# Patient Record
Sex: FEMALE | Race: WHITE | Employment: OTHER | URBAN - METROPOLITAN AREA
[De-identification: names, ages, dates, MRNs, and addresses within clinical notes are randomized per-mention and may not be internally consistent; named-entity substitution may affect disease eponyms.]

---

## 2017-10-03 ENCOUNTER — OFFICE VISIT (OUTPATIENT)
Dept: INTERNAL MEDICINE CLINIC | Age: 72
End: 2017-10-03

## 2017-10-03 ENCOUNTER — HOSPITAL ENCOUNTER (OUTPATIENT)
Dept: LAB | Age: 72
Discharge: HOME | End: 2017-10-03
Payer: MEDICARE

## 2017-10-03 VITALS
SYSTOLIC BLOOD PRESSURE: 130 MMHG | BODY MASS INDEX: 28.32 KG/M2 | OXYGEN SATURATION: 97 % | RESPIRATION RATE: 12 BRPM | TEMPERATURE: 97.7 F | HEIGHT: 65 IN | DIASTOLIC BLOOD PRESSURE: 82 MMHG | HEART RATE: 85 BPM | WEIGHT: 170 LBS

## 2017-10-03 DIAGNOSIS — Z00.00 MEDICARE ANNUAL WELLNESS VISIT, SUBSEQUENT: Primary | ICD-10-CM

## 2017-10-03 DIAGNOSIS — F51.02 ADJUSTMENT INSOMNIA: ICD-10-CM

## 2017-10-03 DIAGNOSIS — M85.88 OSTEOPENIA OF SPINE: ICD-10-CM

## 2017-10-03 DIAGNOSIS — M17.0 PRIMARY OSTEOARTHRITIS OF BOTH KNEES: ICD-10-CM

## 2017-10-03 DIAGNOSIS — Z23 ENCOUNTER FOR IMMUNIZATION: ICD-10-CM

## 2017-10-03 PROCEDURE — 82306 VITAMIN D 25 HYDROXY: CPT

## 2017-10-03 PROCEDURE — 36415 COLL VENOUS BLD VENIPUNCTURE: CPT

## 2017-10-03 RX ORDER — LANOLIN ALCOHOL/MO/W.PET/CERES
3 CREAM (GRAM) TOPICAL
COMMUNITY
Start: 2017-10-03

## 2017-10-03 NOTE — PROGRESS NOTES
HISTORY OF PRESENT ILLNESS  Jose Rowan is a 74 y.o. female. HPI  Here for medicare wellness. Osteopenia on dexa 2015. On calcium and vit D. Hx of osteoarthritis involving knees. Uses naproxen prn with some relief but limits exercise. Patient Active Problem List   Diagnosis Code    Basal cell cancer C44.91    RBBB I45.10    Advanced care planning/counseling discussion Z71.89     Past Surgical History:   Procedure Laterality Date    HX CATARACT REMOVAL      bilateral    HX HYSTERECTOMY      1977 - bleeding    HX ORTHOPAEDIC      Left Knee meniscus tear     Social History     Social History    Marital status:      Spouse name: N/A    Number of children: N/A    Years of education: N/A     Occupational History    Not on file. Social History Main Topics    Smoking status: Never Smoker    Smokeless tobacco: Never Used    Alcohol use 1.2 - 1.8 oz/week     2 - 3 Glasses of wine per week      Comment: social    Drug use: No    Sexual activity: Not Currently     Other Topics Concern    Not on file     Social History Narrative     Family History   Problem Relation Age of Onset    Cancer Mother      colon    Heart Disease Father     Heart Disease Paternal Uncle     Heart Disease Paternal Grandfather     Diabetes Neg Hx      Current Outpatient Prescriptions   Medication Sig    melatonin 3 mg tablet Take 1 Tab by mouth nightly.  estradiol (ESTRACE) 0.5 mg tablet TAKE 1 TABLET BY MOUTH DAILY    naproxen (NAPROSYN) 375 mg tablet Take 1 Tab by mouth two (2) times daily (with meals). (Patient taking differently: Take 375 mg by mouth daily as needed.)    calcium citrate-vitamin d3 (CITRACAL + D) 315-200 mg-unit tab Take 1 Tab by mouth daily (with breakfast).  multivitamin (ONE A DAY) tablet Take 1 Tab by mouth BID Mon Wed & Fri.  Cholecalciferol, Vitamin D3, (VITAMIN D3) 1,000 unit cap Take 1,000 Units by mouth daily.     GLUCOSAMINE/CHONDRO BARROSO A (COSAMIN DS PO) Take 1 Tab by mouth daily. No current facility-administered medications for this visit. No Known Allergies  Immunization History   Administered Date(s) Administered    Influenza High Dose Vaccine PF 09/30/2015, 09/18/2016, 10/03/2017    Influenza Vaccine 11/01/2014    Pneumococcal Conjugate (PCV-13) 08/07/2015    Pneumococcal Vaccine (Unspecified Type) 12/10/2012         Review of Systems   Psychiatric/Behavioral: The patient has insomnia (trouble maintiaining sleep for months or longer. fatigue, drrowsiness during day). Physical Exam   Constitutional: She appears well-developed and well-nourished. No distress. /82 (BP 1 Location: Left arm, BP Patient Position: Sitting)  Pulse 85  Temp 97.7 °F (36.5 °C) (Oral)   Resp 12  Ht 5' 5\" (1.651 m)  Wt 170 lb (77.1 kg)  SpO2 97%  BMI 28.29 kg/m2Body mass index is 28.29 kg/(m^2). HENT:   Mouth/Throat: Oropharynx is clear and moist.   Neck: No JVD present. Carotid bruit is not present. Cardiovascular: Normal rate, regular rhythm, normal heart sounds and intact distal pulses. Pulmonary/Chest: Effort normal and breath sounds normal.   Musculoskeletal: She exhibits no edema. Neurological: She is alert. Skin: Skin is warm and dry. She is not diaphoretic. Nursing note and vitals reviewed. ASSESSMENT and PLAN  Diagnoses and all orders for this visit:    1. Medicare annual wellness visit, subsequent  Delvin Velázquez received a complete medicare wellness assessment today by Lia Devi, PharmD. I've reviewed her assessment note and all screening/preventative plans addressed. I also addressed all questions and concerns surrounding the assessment and plans with Delvin Velázquez. 2. Osteopenia of spine - check vit D. Recheck BMD in 1 year  -     VITAMIN D, 25 HYDROXY    3. Adjustment insomnia  Try melatonin. 4. Encounter for immunization  -     Influenza virus vaccine ([address]) 72 years and older ([address]759)    5.  Primary osteoarthritis of both knees - nonwt bearing exercises discussed. Tylenol up to 1g tid      Follow-up Disposition:  Return in about 1 year (around 10/3/2018).

## 2017-10-03 NOTE — PROGRESS NOTES
Dr. Matt Vargas referred Heather Kumar, 1945, a 74 y.o. female for a Medicare Annual Wellness Visit (AWV)    This is a Subsequent Medicare Annual Wellness Exam (AWV) (Performed 12 months after IPPE or effective date of Medicare Part B enrollment, Once in a lifetime)    I have reviewed the patient's medical history in detail and updated the computerized patient record. History     Past Medical History:   Diagnosis Date    Achilles tendinitis 2015    Arthritis     knees    Falling hair     Fatigue     Insomnia     Joint pain     Plantar fasciitis       Past Surgical History:   Procedure Laterality Date    HX CATARACT REMOVAL      bilateral    HX HYSTERECTOMY      1977 - bleeding    HX ORTHOPAEDIC      Left Knee meniscus tear     Current Outpatient Prescriptions   Medication Sig Dispense Refill    melatonin 3 mg tablet Take 1 Tab by mouth nightly.  estradiol (ESTRACE) 0.5 mg tablet TAKE 1 TABLET BY MOUTH DAILY 90 Tab 1    naproxen (NAPROSYN) 375 mg tablet Take 1 Tab by mouth two (2) times daily (with meals). (Patient taking differently: Take 375 mg by mouth daily as needed.) 20 Tab 0    calcium citrate-vitamin d3 (CITRACAL + D) 315-200 mg-unit tab Take 1 Tab by mouth daily (with breakfast).  multivitamin (ONE A DAY) tablet Take 1 Tab by mouth BID Mon Wed & Fri.  Cholecalciferol, Vitamin D3, (VITAMIN D3) 1,000 unit cap Take 1,000 Units by mouth daily.  GLUCOSAMINE/CHONDRO BARROSO A (COSAMIN DS PO) Take 1 Tab by mouth daily.        No Known Allergies  Family History   Problem Relation Age of Onset    Cancer Mother      colon    Heart Disease Father     Heart Disease Paternal Uncle     Heart Disease Paternal Grandfather     Diabetes Neg Hx      Social History   Substance Use Topics    Smoking status: Never Smoker    Smokeless tobacco: Never Used    Alcohol use 1.2 - 1.8 oz/week     2 - 3 Glasses of wine per week      Comment: social     Patient Active Problem List Diagnosis Code    Basal cell cancer C44.91    RBBB I45.10    Advanced care planning/counseling discussion Z71.89       Depression Risk Factor Screening:     PHQ over the last two weeks 10/3/2017   PHQ Not Done Patient Decline   Little interest or pleasure in doing things -   Feeling down, depressed or hopeless -   Total Score PHQ 2 -     Alcohol Risk Factor Screening: You do not drink alcohol or very rarely. Functional Ability and Level of Safety:   Hearing Loss  Hearing is good. Activities of Daily Living  The home contains: no safety equipment. Patient does total self care    ADL Assessment 10/3/2017   Feeding yourself No Help Needed   Getting from bed to chair No Help Needed   Getting dressed No Help Needed   Bathing or showering No Help Needed   Walk across the room (includes cane/walker) No Help Needed   Using the telphone No Help Needed   Taking your medications No Help Needed   Preparing meals No Help Needed   Managing money (expenses/bills) No Help Needed   Moderately strenuous housework (laundry) No Help Needed   Shopping for personal items (toiletries/medicines) No Help Needed   Shopping for groceries No Help Needed   Driving No Help Needed   Climbing a flight of stairs No Help Needed   Getting to places beyond walking distances No Help Needed       Fall RiskFall Risk Assessment, last 12 mths 10/3/2017   Able to walk? Yes   Fall in past 12 months? No   Fall with injury? -   Number of falls in past 12 months -   Fall Risk Score -       Abuse Screen  Patient is not abused    Abuse Screening Questionnaire 10/3/2017   Do you ever feel afraid of your partner? N   Are you in a relationship with someone who physically or mentally threatens you? N   Is it safe for you to go home?  Y       Cognitive Screening   Evaluation of Cognitive Function:  Has your family/caregiver stated any concerns about your memory: no  Normal    Patient Care Team   Patient Care Team:  Burton Grey MD as PCP - General (Internal Medicine)    Assessment/Plan   Education and counseling provided:  Are appropriate based on today's review and evaluation  End-of-Life planning (with patient's consent)  Pneumococcal Vaccine  Influenza Vaccine  Screening Mammography  Colorectal cancer screening tests  Cardiovascular screening blood test  Bone mass measurement (DEXA)  Screening for glaucoma  Diabetes screening test  Tdap and Zostavax    Diagnoses and all orders for this visit:    1. Medicare annual wellness visit, subsequent    2. Osteopenia of spine  -     VITAMIN D, 25 HYDROXY    3. Adjustment insomnia    4. Encounter for immunization  -     Influenza virus vaccine ([address]) 72 years and older (85459)    5. Primary osteoarthritis of both knees        Health Maintenance Due   Topic Date Due    DTaP/Tdap/Td series (1 - Tdap) 01/12/1964    FOBT Q 1 YEAR AGE 50-75  01/12/1993    ZOSTER VACCINE AGE 60>  11/12/2002    INFLUENZA AGE 9 TO ADULT  08/01/2017    MEDICARE YEARLY EXAM  09/28/2017     . Medicare Annual Wellness Visit:  ----Immunizations: Patient confirmed the following records of vaccinations are correct and current. Immunization History   Administered Date(s) Administered    Influenza High Dose Vaccine PF 09/30/2015, 09/18/2016, 10/03/2017    Influenza Vaccine 11/01/2014    Pneumococcal Conjugate (PCV-13) 08/07/2015    Pneumococcal Vaccine (Unspecified Type) 12/10/2012   Encouraged patient to get a Tdap vaccine since she is around her young grandchildren. She received a high dose flu shot today. Patient is not interested in the shingles vaccine at this time as her  is immunocompromised. ----Screenings: See chart in patient instructions for specific information. ADL's, Fall Risk, Depression Screening and Abuse screening information is reported above. Patient is eligible for a repeat dexa any time now- last was done 9/2015 and showed osteopenia.  Patient is due for repeat colonoscopy in 2022 and sees  Ana Coffman for her eye exams twice a year. Patient is interested in spreading mammograms out to every 2 years. Reviewed labwork with patient from 9/2016.     ----Medication Reconciliation was performed today and the following 4 changes were made: directions added to vitamin D, MVI and glucosamine. Medications Discontinued During This Encounter   Medication Reason    ranitidine (ZANTAC) 150 mg tablet Not A Current Medication     ----Advanced Care Plan: Patient educated on the importance of an advanced care plan and paperwork was given to the patient today. Asked patient to read over the information and bring it back to her next appointment with her physician. Patient verbalized understanding of information presented. Answered all of the patient's questions. AVS handed and reviewed with patient which includes a Medicare Wellness Preventative Screening Table and patient specific information. Notification of recommendations will be sent to Dr. Jona Libman for review.     Brigitte Steven, PharmD, BCPS, CDE

## 2017-10-03 NOTE — PATIENT INSTRUCTIONS
Medicare Part B Preventive Services Limitations Recommendation Scheduled   Bone Mass Measurement  (age 72 & older, biennial) Requires diagnosis related to osteoporosis or estrogen deficiency. Biennial benefit unless patient has history of long-term glucocorticoid tx or baseline is needed because initial test was by other method Last: 9/1/2015    A DEXA scan is recommended after you turn 72years of age to determine your risk for osteoporosis Next: 9/2017 or as recommended by your physician   Colorectal Cancer Screening  -Fecal occult blood test (annual)  -Flexible sigmoidoscopy (5y)  -Screening colonoscopy (10y)  -Barium Enema  Last: 7/2017    Every 5-10 years depending on your colonoscopy result starting at age 48 years Next: 2022 or as recommended by your physician   Glaucoma Screening (no USPSTF recommendation) Diabetes mellitus, family history, , age 48 or over,  American, age 72 or over Last: 8/2017    Karine Tom    Every year Next: goes twice a year   Screening Mammography (biennial age 54-69) Annually (age 36 or over) Last: 9/2016 Next: 9/2017   Screening Pap Tests and Pelvic Examination (up to age 72 and after 72 if unknown history or abnormal study last 10 years) Every 24 months except high risk Last: NA Next:    Discuss with your doctor if this screening is appropriate for you. Cardiovascular Screening Blood Tests (every 5 years)  Total cholesterol, HDL, Triglycerides Order as a panel if possible Last: 2015    Every Year Next: As recommended by your physician   Diabetes Screening Tests (at least every 3 years, Medicare covers annually or at 6-month intervals for prediabetic patients)    Fasting blood sugar (FBS) or glucose tolerance test (GTT) Patient must be diagnosed with one of the following:  -Hypertension, Dyslipidemia, obesity, previous impaired FBS or GTT  Or any two of the following: overweight, FH of diabetes, age ? 72, history of gestational diabetes, birth of baby weighing more than 9 pounds Last: 9/2016    Every 3-6 months depending on your result    Every 3 years Next: Check with yearly labs   Diabetes Self-Management Training (DSMT) (no USPSTF recommendation) Requires referral by treating physician for patient with diabetes or renal disease. 10 hours of initial DSMT session of no less than 30 minutes each in a continuous 12-month period. 2 hours of follow-up DSMT in subsequent years. Last: NA Talk to your doctor if you are interested in a refresher course. Medical Nutrition Therapy (MNT) (for diabetes or renal disease not recommended schedule) Requires referral by treating physician for patient with diabetes or renal disease. Can be provided in same year as diabetes self-management training (DSMT), and CMS recommends medical nutrition therapy take place after DSMT. Up to 3 hours for initial year and 2 hours in subsequent years. Last: NA Talk to your doctor if you are interested in a refresher course. Seasonal Influenza Vaccination (annually)  Last: 2016    Every Fall Please get one this Fall. Pneumococcal Vaccination (once after 65)  Last:  Pneumovax:  12/2012  Prevnar-13:  8/2015 Complete   Shingles Vaccination  Last:    A shingles vaccine is recommended once in a lifetime after age 61  is on chemo   Tetanus, Diphtheria and Pertussis (Tdap) Vaccination Booster One Booster as an adult and then tetanus every 10 years or as indicated Last: Please get one at your pharmacy. Hepatitis B Vaccinations (if medium/high risk) Medium/high risk factors:  End-stage renal disease,  Hemophiliacs who received Factor VIII or IX concentrates, Clients of institutions for the mentally retarded, Persons who live in the same house as a HepB virus carrier, Homosexual men, Illicit injectable drug abusers.  Last: NA Next: NA   Counseling to Prevent Tobacco Use (up to 8 sessions per year)  - Counseling greater than 3 and up to 10 minutes  - Counseling greater than 10 minutes Patients must be asymptomatic of tobacco-related conditions to receive as preventive service Last: NA Next: NA   Human Immunodeficiency Virus (HIV) Screening (annually for increased risk patients)  HIV-1 and HIV-2 by EIA, VANNESA, rapid antibody test, or oral mucosa transudate Patient must be at increased risk for HIV infection per USPSTF guidelines or pregnant. Tests covered annually for patients at increased risk. Pregnant patients may receive up to 3 test during pregnancy. Last: NA Next: NA   Ultrasound Screening for Abdominal Aortic Aneurysm (AAA) once Patient must be referred through IPPE and not have had a screening for abdominal aortic aneurysm before under medicare. Limited to patients who meet onf of the following criteria:  -Men who are 73-68 years old and have smoked more than 100 cigarettes in their lifetime  -Anyone with a FH of AAA  -Anyone recommended for screening by the USPSFTF As recommended by your PCP or Specialist   As recommended by your PCP or Specialist     NA = Not Applicable; NI= Not Indicated     1) Consider getting the Tetanus/Pertussis (Tetanus with whooping cough) at your local pharmacy.

## 2017-10-03 NOTE — MR AVS SNAPSHOT
Visit Information Date & Time Provider Department Dept. Phone Encounter #  
 10/3/2017  2:30 PM Giovana Whitney MD [facility] [address] [id number] Follow-up Instructions Return in about 1 year (around 10/3/2018). Upcoming Health Maintenance Date Due DTaP/Tdap/Td series (1 - Tdap) 1/12/1964 FOBT Q 1 YEAR AGE 50-75 1/12/1993 ZOSTER VACCINE AGE 60> 11/12/2002 INFLUENZA AGE 9 TO ADULT 8/1/2017 MEDICARE YEARLY EXAM 9/28/2017 GLAUCOMA SCREENING Q2Y 8/19/2018 Allergies as of 10/3/2017  Review Complete On: 10/3/2017 By: Aliya Fraser No Known Allergies Current Immunizations  Reviewed on 12/10/2014 Name Date Influenza High Dose Vaccine PF  Incomplete, 9/18/2016, 9/30/2015 Influenza Vaccine 11/1/2014 Pneumococcal Conjugate (PCV-13) 8/7/2015 Pneumococcal Vaccine (Unspecified Type) 12/10/2012 Not reviewed this visit You Were Diagnosed With   
  
 Codes Comments Medicare annual wellness visit, subsequent    -  Primary ICD-10-CM: Z00.00 ICD-9-CM: V70.0 Osteopenia of spine     ICD-10-CM: M85.88 ICD-9-CM: 733.90 Adjustment insomnia     ICD-10-CM: F51.02 
ICD-9-CM: 307.41 Encounter for immunization     ICD-10-CM: B75 ICD-9-CM: V03.89 Vitals BP Pulse Temp Resp Height(growth percentile) Weight(growth percentile) 130/82 (BP 1 Location: Left arm, BP Patient Position: Sitting) 85 97.7 °F (36.5 °C) (Oral) 12 5' 5\" (1.651 m) 170 lb (77.1 kg) SpO2 BMI OB Status Smoking Status 97% 28.29 kg/m2 Hysterectomy Never Smoker Vitals History BMI and BSA Data Body Mass Index Body Surface Area  
 28.29 kg/m 2 1.88 m 2 Preferred Pharmacy Pharmacy Name Phone [facility] 1 [address]  [facility] [phone] Your Updated Medication List  
  
   
 This list is accurate as of: 10/3/17  3:20 PM.  Always use your most recent med list.  
  
  
  
  
 Willye New [place] D 315-200 mg-unit Tab Generic drug:  calcium citrate-vitamin d3 Take 1 Tab by mouth daily (with breakfast). COSAMIN DS PO Take 1 Tab by mouth daily. estradiol 0.5 mg tablet Commonly known as:  ESTRACE  
TAKE 1 TABLET BY MOUTH DAILY  
  
 melatonin 3 mg tablet Take 1 Tab by mouth nightly. multivitamin tablet Commonly known as:  ONE A DAY Take 1 Tab by mouth BID Mon Wed & Fri.  
  
 naproxen 375 mg tablet Commonly known as:  NAPROSYN Take 1 Tab by mouth two (2) times daily (with meals). VITAMIN D3 1,000 unit Cap Generic drug:  cholecalciferol Take 1,000 Units by mouth daily. We Performed the Following INFLUENZA VIRUS VACCINE, HIGH DOSE SEASONAL, PRESERVATIVE FREE [68280 CPT(R)] VITAMIN D, 25 HYDROXY [id number] CPT(R)] Follow-up Instructions Return in about 1 year (around 10/3/2018). Patient Instructions Medicare Part B Preventive Services Limitations Recommendation Scheduled Bone Mass Measurement 
(age 72 & older, biennial) Requires diagnosis related to osteoporosis or estrogen deficiency. Biennial benefit unless patient has history of long-term glucocorticoid tx or baseline is needed because initial test was by other method Last: 9/1/2015 A DEXA scan is recommended after you turn 72years of age to determine your risk for osteoporosis Next: 9/2017 or as recommended by your physician Colorectal Cancer Screening 
-Fecal occult blood test (annual) -Flexible sigmoidoscopy (5y) 
-Screening colonoscopy (10y) -Barium Enema  Last: 7/2017 Every 5-10 years depending on your colonoscopy result starting at age 48 years Next: 2022 or as recommended by your physician Glaucoma Screening (no USPSTF recommendation) Diabetes mellitus, family history, , age 48 or over,  American, age 72 or over Last: 8/2017 Jackelyn Hester Every year Next: goes twice a year Screening Mammography (biennial age 54-69) Annually (age 36 or over) Last: 9/2016 Next: 9/2017 Screening Pap Tests and Pelvic Examination (up to age 72 and after 72 if unknown history or abnormal study last 10 years) Every 24 months except high risk Last: NA Next: 
 
Discuss with your doctor if this screening is appropriate for you. Cardiovascular Screening Blood Tests (every 5 years) Total cholesterol, HDL, Triglycerides Order as a panel if possible Last: 2015 Every Year Next: As recommended by your physician Diabetes Screening Tests (at least every 3 years, Medicare covers annually or at 6-month intervals for prediabetic patients) Fasting blood sugar (FBS) or glucose tolerance test (GTT) Patient must be diagnosed with one of the following: 
-Hypertension, Dyslipidemia, obesity, previous impaired FBS or GTT 
Or any two of the following: overweight, FH of diabetes, age ? 72, history of gestational diabetes, birth of baby weighing more than 9 pounds Last: 9/2016 Every 3-6 months depending on your result Every 3 years Next: Check with yearly labs Diabetes Self-Management Training (DSMT) (no USPSTF recommendation) Requires referral by treating physician for patient with diabetes or renal disease. 10 hours of initial DSMT session of no less than 30 minutes each in a continuous 12-month period. 2 hours of follow-up DSMT in subsequent years. Last: NA Talk to your doctor if you are interested in a refresher course. Medical Nutrition Therapy (MNT) (for diabetes or renal disease not recommended schedule) Requires referral by treating physician for patient with diabetes or renal disease. Can be provided in same year as diabetes self-management training (DSMT), and CMS recommends medical nutrition therapy take place after DSMT.   Up to 3 hours for initial year and 2 hours in subsequent years. Last: NA Talk to your doctor if you are interested in a refresher course. Seasonal Influenza Vaccination (annually)  Last: 2016 Every Fall Please get one this Fall. Pneumococcal Vaccination (once after 65)  Last: 
Pneumovax: 
12/2012 Prevnar-13: 
8/2015 Complete Shingles Vaccination  Last: A shingles vaccine is recommended once in a lifetime after age 61  is on chemo Tetanus, Diphtheria and Pertussis (Tdap) Vaccination Booster One Booster as an adult and then tetanus every 10 years or as indicated Last: Please get one at your pharmacy. Hepatitis B Vaccinations (if medium/high risk) Medium/high risk factors:  End-stage renal disease, Hemophiliacs who received Factor VIII or IX concentrates, Clients of institutions for the mentally retarded, Persons who live in the same house as a HepB virus carrier, Homosexual men, Illicit injectable drug abusers. Last: NA Next: NA  
Counseling to Prevent Tobacco Use (up to 8 sessions per year) - Counseling greater than 3 and up to 10 minutes - Counseling greater than 10 minutes Patients must be asymptomatic of tobacco-related conditions to receive as preventive service Last: NA Next: NA Human Immunodeficiency Virus (HIV) Screening (annually for increased risk patients) HIV-1 and HIV-2 by EIA, VANNESA, rapid antibody test, or oral mucosa transudate Patient must be at increased risk for HIV infection per USPSTF guidelines or pregnant. Tests covered annually for patients at increased risk. Pregnant patients may receive up to 3 test during pregnancy. Last: NA Next: NA Ultrasound Screening for Abdominal Aortic Aneurysm (AAA) once Patient must be referred through IPPE and not have had a screening for abdominal aortic aneurysm before under medicare. Limited to patients who meet onf of the following criteria: 
-Men who are 73-68 years old and have smoked more than 100 cigarettes in their lifetime 
-Anyone with a FH of AAA -Anyone recommended for screening by the USPSFTF As recommended by your PCP or Specialist 
 As recommended by your PCP or Specialist 
  
NA = Not Applicable; NI= Not Indicated 1) Consider getting the Tetanus/Pertussis (Tetanus with whooping cough) at your local pharmacy. Introducing [facility] & [facility] SERVICES! Dear Bay Thomason: 
Thank you for requesting a [employer] account. Our records indicate that you already have an active [employer] account. You can access your account anytime at [email or web address] Did you know that you can access your hospital and ER discharge instructions at any time in Lot[employer]? You can also review all of your test results from your hospital stay or ER visit. Additional Information If you have questions, please visit the Frequently Asked Questions section of the [employer] website at [email or web address] Remember, [employer] is NOT to be used for urgent needs. For medical emergencies, dial 911. Now available from your iPhone and Android! Please provide this summary of care documentation to your next provider. Your primary care clinician is listed as Hank Arizmendi. If you have any questions after today's visit, please call [phone].

## 2017-10-04 LAB — 25(OH)D3+25(OH)D2 SERPL-MCNC: 42.9 NG/ML (ref 30–100)

## 2017-10-31 ENCOUNTER — OFFICE VISIT (OUTPATIENT)
Dept: INTERNAL MEDICINE CLINIC | Age: 72
End: 2017-10-31

## 2017-10-31 VITALS
SYSTOLIC BLOOD PRESSURE: 130 MMHG | OXYGEN SATURATION: 96 % | RESPIRATION RATE: 18 BRPM | TEMPERATURE: 97.8 F | BODY MASS INDEX: 28.22 KG/M2 | WEIGHT: 169.38 LBS | HEIGHT: 65 IN | DIASTOLIC BLOOD PRESSURE: 81 MMHG | HEART RATE: 81 BPM

## 2017-10-31 DIAGNOSIS — H61.21 EXCESSIVE CERUMEN IN EAR CANAL, RIGHT: Primary | ICD-10-CM

## 2017-10-31 NOTE — PROGRESS NOTES
HISTORY OF PRESENT ILLNESS  Kit Dee is a 74 y.o. female. HPI  Problem visit:  Kit Dee is here for complaint of R ear wax. Unknown duration  Severity is mild  Character of problem: hearing loss on r side  wax makes the problem worse. nothing makes the problem better. Associated symptoms include: no otalgia, drainage  Treatments tried include: medication not used      ROS    Physical Exam   HENT:   Right Ear: Ear canal normal.   Left Ear: Tympanic membrane and ear canal normal. Tympanic membrane is not injected. No decreased hearing is noted. 90 % cerumen impaction in R canal otherwis normal.   Lymphadenopathy:     She has no cervical adenopathy. Visit Vitals    /81 (BP 1 Location: Left arm, BP Patient Position: Sitting)    Pulse 81    Temp 97.8 °F (36.6 °C) (Oral)    Resp 18    Ht 5' 5\" (1.651 m)    Wt 169 lb 6 oz (76.8 kg)    SpO2 96%    BMI 28.19 kg/m2         ASSESSMENT and PLAN  Diagnoses and all orders for this visit:    1. Excessive cerumen in ear canal, right  -     REMOVE IMPACTED EAR WAX  Mineral oil 1-2 gtts each ear nightly.       Follow-up Disposition: Not on File

## 2018-02-22 ENCOUNTER — OFFICE VISIT (OUTPATIENT)
Dept: INTERNAL MEDICINE CLINIC | Age: 73
End: 2018-02-22

## 2018-02-22 VITALS
WEIGHT: 169.5 LBS | HEIGHT: 65 IN | SYSTOLIC BLOOD PRESSURE: 141 MMHG | RESPIRATION RATE: 18 BRPM | TEMPERATURE: 98.4 F | OXYGEN SATURATION: 95 % | DIASTOLIC BLOOD PRESSURE: 80 MMHG | HEART RATE: 81 BPM | BODY MASS INDEX: 28.24 KG/M2

## 2018-02-22 DIAGNOSIS — S39.012A LUMBAR STRAIN, INITIAL ENCOUNTER: Primary | ICD-10-CM

## 2018-02-22 RX ORDER — DICLOFENAC SODIUM 75 MG/1
75 TABLET, DELAYED RELEASE ORAL 2 TIMES DAILY
Qty: 30 TAB | Refills: 0 | Status: SHIPPED | OUTPATIENT
Start: 2018-02-22 | End: 2018-03-27 | Stop reason: ALTCHOICE

## 2018-02-22 RX ORDER — CYCLOBENZAPRINE HCL 10 MG
10 TABLET ORAL
Qty: 15 TAB | Refills: 0 | Status: SHIPPED | OUTPATIENT
Start: 2018-02-22 | End: 2018-03-27 | Stop reason: ALTCHOICE

## 2018-02-22 NOTE — PATIENT INSTRUCTIONS
Back Strain: Care Instructions  Your Care Instructions    Back strain happens when you overstretch, or pull, a muscle in your back. You may hurt your back in an accident or when you exercise or lift something. Most back pain will get better with rest and time. You can take care of yourself at home to help your back heal.  Follow-up care is a key part of your treatment and safety. Be sure to make and go to all appointments, and call your doctor if you are having problems. It's also a good idea to know your test results and keep a list of the medicines you take. How can you care for yourself at home? · Try to stay as active as you can, but stop or reduce any activity that causes pain. · Put ice or a cold pack on the sore muscle for 10 to 20 minutes at a time to stop swelling. Try this every 1 to 2 hours for 3 days (when you are awake) or until the swelling goes down. Put a thin cloth between the ice pack and your skin. · After 2 or 3 days, apply a heating pad on low or a warm cloth to your back. Some doctors suggest that you go back and forth between hot and cold treatments. · Take pain medicines exactly as directed. ¨ If the doctor gave you a prescription medicine for pain, take it as prescribed. ¨ If you are not taking a prescription pain medicine, ask your doctor if you can take an over-the-counter medicine. · Try sleeping on your side with a pillow between your legs. Or put a pillow under your knees when you lie on your back. These measures can ease pain in your lower back. · Return to your usual level of activity slowly. When should you call for help? Call 911 anytime you think you may need emergency care. For example, call if:  ? · You are unable to move a leg at all. ?Call your doctor now or seek immediate medical care if:  ? · You have new or worse symptoms in your legs, belly, or buttocks. Symptoms may include:  ¨ Numbness or tingling. ¨ Weakness. ¨ Pain.    ? · You lose bladder or bowel control. ? Watch closely for changes in your health, and be sure to contact your doctor if you are not getting better as expected. Where can you learn more? Go to http://ihsan-lilian.info/. Enter Q021 in the search box to learn more about \"Back Strain: Care Instructions. \"  Current as of: March 21, 2017  Content Version: 11.4  © 7952-4987 [employer]. Care instructions adapted under license by [employer] (which disclaims liability or warranty for this information). If you have questions about a medical condition or this instruction, always ask your healthcare professional. [place] [address] any warranty or liability for your use of this information.

## 2018-02-22 NOTE — PROGRESS NOTES
HISTORY OF PRESENT ILLNESS  Matthew Florez is a 74 y.o. female. HPI  Low Back Pain:  Matthew Florez is a 74 y.o. female who complains of low back pain on the right for 3 week(s), is positional with torso twisting, with radiation down to upper buttock. Severity of pain is 10 out of 10.  numbness, tingling, weakness is not present in right leg(s)/ foot. Precipitating factors: none recalled by the patient. Prior history of back problems: recurrent self limited episodes of low back pain in the past.  Self treatment:  Aleve and mobic used but not effective . The patient denies fevers, chills or sweats. The patient denies bowel/bladder incontinence and saddle numbness. Patient Active Problem List   Diagnosis Code    Basal cell cancer C44.91    RBBB I45.10    Advanced care planning/counseling discussion Z71.89     Past Medical History:   Diagnosis Date    Achilles tendinitis 2015    Arthritis     knees    Falling hair     Fatigue     Insomnia     Joint pain     Plantar fasciitis      No Known Allergies  Current Outpatient Prescriptions on File Prior to Visit   Medication Sig Dispense Refill    melatonin 3 mg tablet Take 1 Tab by mouth nightly.  estradiol (ESTRACE) 0.5 mg tablet TAKE 1 TABLET BY MOUTH DAILY 90 Tab 1    calcium citrate-vitamin d3 (CITRACAL + D) 315-200 mg-unit tab Take 1 Tab by mouth daily (with breakfast).  multivitamin (ONE A DAY) tablet Take 1 Tab by mouth BID Mon Wed & Fri.  Cholecalciferol, Vitamin D3, (VITAMIN D3) 1,000 unit cap Take 1,000 Units by mouth daily.  GLUCOSAMINE/CHONDRO BARROSO A (COSAMIN DS PO) Take 1 Tab by mouth daily. No current facility-administered medications on file prior to visit.       Social History   Substance Use Topics    Smoking status: Never Smoker    Smokeless tobacco: Never Used    Alcohol use 1.2 - 1.8 oz/week     2 - 3 Glasses of wine per week      Comment: social         ROS    Physical Exam  Current vital are:   Visit Vitals    /80 (BP 1 Location: Left arm, BP Patient Position: Sitting)    Pulse 81    Temp 98.4 °F (36.9 °C) (Oral)    Resp 18    Ht 5' 5\" (1.651 m)    Wt 169 lb 8 oz (76.9 kg)    SpO2 95%    BMI 28.21 kg/m2      Patient appears to be in mild pain, antalgic gait noted. Lumbosacral spine area reveals mild local tenderness - R parasacral area. Painful LS ROM noted. Spinal alignment is normal.  Straight leg raise is negative at 90 degrees on both sides. Lumbosacral distribution DTR's, motor strength and sensation are normal, including heel and toe gait. Peripheral pulses are palpable and 2 plus. Prior studies inlcude: Lumbar spine X-Ray: not indicated. MRI not indicated    ASSESSMENT and PLAN  Diagnoses and all orders for this visit:    1. Lumbar strain, initial encounter  -     diclofenac EC (VOLTAREN) 75 mg EC tablet; Take 1 Tab by mouth two (2) times a day. -     cyclobenzaprine (FLEXERIL) 10 mg tablet; Take 1 Tab by mouth nightly. -     [address] was counseled on causes and treatment of low back pain. she was educated on stretching and strengthening exercises. Written instructions for these were given. she was also counseled on medications like antiinflammatories, muscle relaxants, analgesics, heat or ice if they were prescribed. she is advised to call our office immediately for any new symptoms like weakness, numbness or worsening pain should they develop. she will follow up with me if not improving over 2-4 weeks. Follow-up Disposition:  Return if symptoms worsen or fail to improve.

## 2018-02-22 NOTE — MR AVS SNAPSHOT
[facility] 
 
 
 [address] 
[phone] Patient: Kate Garcia MRN: [id number] DEQ:9/16/3516 Visit Information Date & Time Provider Department Dept. Phone Encounter #  
 2/22/2018  2:00 PM Diamond Cheng MD [facility] [address] [facility] [id number] Follow-up Instructions Return if symptoms worsen or fail to improve. Upcoming Health Maintenance Date Due DTaP/Tdap/Td series (1 - Tdap) 1/12/1966 ZOSTER VACCINE AGE 60> 11/12/2002 GLAUCOMA SCREENING Q2Y 8/19/2018 MEDICARE YEARLY EXAM 10/4/2018 Allergies as of 2/22/2018  Review Complete On: 2/22/2018 By: Myriam Cho LPN No Known Allergies Current Immunizations  Reviewed on 12/10/2014 Name Date Influenza High Dose Vaccine PF 10/3/2017, 9/18/2016, 9/30/2015 Influenza Vaccine 11/1/2014 Pneumococcal Conjugate (PCV-13) 8/7/2015 Pneumococcal Vaccine (Unspecified Type) 12/10/2012 Not reviewed this visit You Were Diagnosed With   
  
 Codes Comments Lumbar strain, initial encounter    -  Primary ICD-10-CM: S52.938K ICD-9-CM: 151. 2 Vitals BP Pulse Temp Resp Height(growth percentile) Weight(growth percentile) 141/80 (BP 1 Location: Left arm, BP Patient Position: Sitting) 81 98.4 °F (36.9 °C) (Oral) 18 5' 5\" (1.651 m) 169 lb 8 oz (76.9 kg) SpO2 BMI OB Status Smoking Status 95% 28.21 kg/m2 Hysterectomy Never Smoker Vitals History BMI and BSA Data Body Mass Index Body Surface Area  
 28.21 kg/m 2 1.88 m 2 Preferred Pharmacy Pharmacy Name Phone [facility] 1 [address]  [facility] [phone] Your Updated Medication List  
  
   
This list is accurate as of 2/22/18  2:26 PM.  Always use your most recent med list.  
  
  
  
  
 Krysta PANDYA 315-200 mg-unit Tab Generic drug:  calcium citrate-vitamin d3 Take 1 Tab by mouth daily (with breakfast). COSAMIN DS PO Take 1 Tab by mouth daily. cyclobenzaprine 10 mg tablet Commonly known as:  FLEXERIL Take 1 Tab by mouth nightly. diclofenac EC 75 mg EC tablet Commonly known as:  VOLTAREN Take 1 Tab by mouth two (2) times a day. estradiol 0.5 mg tablet Commonly known as:  ESTRACE  
TAKE 1 TABLET BY MOUTH DAILY  
  
 melatonin 3 mg tablet Take 1 Tab by mouth nightly. multivitamin tablet Commonly known as:  ONE A DAY Take 1 Tab by mouth BID Mon Wed & Fri. VITAMIN D3 1,000 unit Cap Generic drug:  cholecalciferol Take 1,000 Units by mouth daily. Prescriptions Sent to Pharmacy Refills  
 diclofenac EC (VOLTAREN) 75 mg EC tablet 0 Sig: Take 1 Tab by mouth two (2) times a day. Class: Normal  
 Pharmacy: [facility] [address] [place] [address] [address] AT [facility] of [address] ([facility] Ph #: [phone] Route: Oral  
 cyclobenzaprine (FLEXERIL) 10 mg tablet 0 Sig: Take 1 Tab by mouth nightly. Class: Normal  
 Pharmacy: [facility] [address] [place] [address] [address] AT [facility] of [address] ([facility] Ph #: [phone] Route: Oral  
  
We Performed the Following REFERRAL TO PHYSICAL THERAPY [WKJ69 Custom] Follow-up Instructions Return if symptoms worsen or fail to improve. Referral Information Referral ID Referred By Referred To  
  
 [id number] AIDAN, [address] [place] [address] [address] [facility] Martin Stewart Phone: [phone] Visits Status Start Date End Date 1 New Request 2/22/18 2/22/19 If your referral has a status of pending review or denied, additional information will be sent to support the outcome of this decision. Patient Instructions Back Strain: Care Instructions Your Care Instructions Back strain happens when you overstretch, or pull, a muscle in your back. You may hurt your back in an accident or when you exercise or lift something. Most back pain will get better with rest and time. You can take care of yourself at home to help your back heal. 
Follow-up care is a key part of your treatment and safety. Be sure to make and go to all appointments, and call your doctor if you are having problems. It's also a good idea to know your test results and keep a list of the medicines you take. How can you care for yourself at home? · Try to stay as active as you can, but stop or reduce any activity that causes pain. · Put ice or a cold pack on the sore muscle for 10 to 20 minutes at a time to stop swelling. Try this every 1 to 2 hours for 3 days (when you are awake) or until the swelling goes down. Put a thin cloth between the ice pack and your skin. · After 2 or 3 days, apply a heating pad on low or a warm cloth to your back. Some doctors suggest that you go back and forth between hot and cold treatments. · Take pain medicines exactly as directed. ¨ If the doctor gave you a prescription medicine for pain, take it as prescribed. ¨ If you are not taking a prescription pain medicine, ask your doctor if you can take an over-the-counter medicine. · Try sleeping on your side with a pillow between your legs. Or put a pillow under your knees when you lie on your back. These measures can ease pain in your lower back. · Return to your usual level of activity slowly. When should you call for help? Call 911 anytime you think you may need emergency care. For example, call if: 
? · You are unable to move a leg at all. ?Call your doctor now or seek immediate medical care if: 
? · You have new or worse symptoms in your legs, belly, or buttocks. Symptoms may include: ¨ Numbness or tingling. ¨ Weakness. ¨ Pain. ? · You lose bladder or bowel control. ?Watch closely for changes in your health, and be sure to contact your doctor if you are not getting better as expected. Where can you learn more? Go to http://ihsan-lilian.info/. Enter G247 in the search box to learn more about \"Back Strain: Care Instructions. \" Current as of: March 21, 2017 Content Version: 11.4 © 8656-8708 [employer]. Care instructions adapted under license by [employer] (which disclaims liability or warranty for this information). If you have questions about a medical condition or this instruction, always ask your healthcare professional. [address] any warranty or liability for your use of this information. Introducing [facility] & HEALTH SERVICES! Dear Ashley Elizabeth: 
Thank you for requesting a [employer] account. Our records indicate that you already have an active [employer] account. You can access your account anytime at [email or web address] Did you know that you can access your hospital and ER discharge instructions at any time in [employer]? You can also review all of your test results from your hospital stay or ER visit. Additional Information If you have questions, please visit the Frequently Asked Questions section of the [employer] website at [email or web address] Remember, [employer] is NOT to be used for urgent needs. For medical emergencies, dial 911. Now available from your iPhone and Android! Please provide this summary of care documentation to your next provider. Your primary care clinician is listed as Lashanda Nayak. If you have any questions after today's visit, please call [phone].

## 2018-02-27 ENCOUNTER — HOSPITAL ENCOUNTER (OUTPATIENT)
Dept: PHYSICAL THERAPY | Age: 73
Discharge: HOME | End: 2018-02-27
Payer: MEDICARE

## 2018-02-27 PROCEDURE — G8979 MOBILITY GOAL STATUS: HCPCS | Performed by: PHYSICAL THERAPIST

## 2018-02-27 PROCEDURE — 97162 PT EVAL MOD COMPLEX 30 MIN: CPT | Performed by: PHYSICAL THERAPIST

## 2018-02-27 PROCEDURE — 97110 THERAPEUTIC EXERCISES: CPT | Performed by: PHYSICAL THERAPIST

## 2018-02-27 PROCEDURE — G8978 MOBILITY CURRENT STATUS: HCPCS | Performed by: PHYSICAL THERAPIST

## 2018-02-27 NOTE — PROGRESS NOTES
PT INITIAL EVALUATION NOTE - [facility] 2-15    Patient Name: Raad Duarte  Date:2018  : 1945  [x]  Patient  Verified  Payor: Dannie Nieves / Plan: [place] MED[place] & [place] / Product Type: Medicare /    In time:11:10 AM  Out time:12:00 PM  Total Treatment Time (min): 50  Total Timed Codes (min): 40  1:1 Treatment Time ( only): 50   Visit #: 1     Treatment Area: Strain of muscle, fascia and tendon of lower back, initial encounter [S39.012A]    SUBJECTIVE  Pain Level (0-10 scale): 0  Any medication changes, allergies to medications, adverse drug reactions, diagnosis change, or new procedure performed?: [] No    [x] Yes (see summary sheet for update)  Subjective:    Previous episode of low back pain  PLOF: No limitations with bending forward, no regular exercise  Mechanism of Injury: Insidious, 4 weeks ago, patient woke up with severe lumbar muscle spasms. They were especially bad when transferring from a chair. Previous Treatment/Compliance: Her PCP, Dr. Avelino Bennett, prescribed to her a muscle relaxant and Diclofenac and referred her to PT. Both medications worked very well and her pain has reduced significantly.   PMHx/Surgical Hx: Chronic knee OA, L>R  Work Hx: Retired  Living Situation: lives at home with   Pt Goals: to reduce risk of re-injury  Barriers: none  Motivation: very motivated  Substance use: none  FABQ Score: low  Cognition: A & O x 3        OBJECTIVE/EXAMINATION  Gait and Functional Mobility:  Gait: WNL  Sit-to-stand: Significant reliance on UE support, but no pain  Bed mobility: WNL, no pain    Palpation: No areas TTP  Joint Mobility: NT    Lumbar AROM: WNL in all directions except for flexion, limited by 25% due to mild pain and stiffness    Aberrant movements:  Painful arc: [] Yes  [x] No  Instability catch: [] Yes  [x] No  Difficulty returning from flexion: [] Yes  [x] No  Reversal of lumbopelvic rhythm: [] Yes  [x] No             MMT:  Core strength: 4/5   All other LE myotomes: >4/5  Neurological: Sensation: Intact  Special Tests:        Slump: Negative   Eligio: NT        Elizabeth:  NT   SLR:Negative   BINH:Negative          SLS: 5 sec B   Tandem stance: Able to reach heel to bunion and hold for 5 sec B           40 min Therapeutic Exercise:  [x] See flow sheet :   Rationale: increase ROM, increase strength and improve coordination to improve the patients ability to reach forward and tie her shoes without pain          With   [] TE   [] TA   [] neuro   [] other: Patient Education: [x] Review HEP    [] Progressed/Changed HEP based on:   [] positioning   [] body mechanics   [] transfers   [] heat/ice application    [] other:      Other Objective/Functional Measures:    Pain Level (0-10 scale) post treatment: 0    ASSESSMENT/Changes in Function:     [x]  See Plan of Rubin Nash PT , DPT, OCS, Cert.  DN   2/27/2018  11:56 AM

## 2018-02-27 NOTE — PROGRESS NOTES
[address] [place]  Phone: [phone]  Fax: [phone]    Plan of Care/Statement of Necessity for Physical Therapy Services  2-15    Patient name: Get Ngo  : 1945  Provider#: [id number]  Referral source: Siena Trevino MD      Medical/Treatment Diagnosis: Strain of muscle, fascia and tendon of lower back, initial encounter [S39.012A]     Prior Hospitalization: see medical history     Comorbidities: Chronic knee OA  Prior Level of Function: see initial eval  Medications: Verified on Patient Summary List  Start of Care: 18      Onset Date: 18   The Plan of Care and following information is based on the information from the initial evaluation. Assessment/ key information: Patient is 4 weeks following an episode of acute low back pain. She has done well over the past 4 weeks and the pain has improved significantly, however she continues to have limited ROM and impaired core stability. She should progress well with PT with a gradual return to prior level of function. Evaluation Complexity History MEDIUM  Complexity : 1-2 comorbidities / personal factors will impact the outcome/ POC ; Examination MEDIUM Complexity : 3 Standardized tests and measures addressing body structure, function, activity limitation and / or participation in recreation  ;Presentation MEDIUM Complexity : Evolving with changing characteristics  ; Clinical Decision Making MEDIUM Complexity : FOTO score of 26-74  Overall Complexity Rating: MEDIUM    Problem List: pain affecting function, decrease ROM, decrease strength, decrease ADL/ functional abilitiies, decrease activity tolerance, decrease flexibility/ joint mobility and decrease transfer abilities   Treatment Plan may include any combination of the following: Therapeutic exercise, Therapeutic activities, Neuromuscular re-education, Physical agent/modality, Gait/balance training, Manual therapy, Patient education, Self Care training, Functional mobility training, Home safety training and Stair training  Patient / Family readiness to learn indicated by: asking questions, trying to perform skills and interest  Persons(s) to be included in education: patient (P)  Barriers to Learning/Limitations: None  Patient Goal (s): I want to be able to return to regular exercise without pain.   Patient Self Reported Health Status: excellent  Rehabilitation Potential: excellent    Short Term Goals: To be accomplished in 8 treatments:  1. Patient will be able to bend forward and tie her shoes with no pain or limitation. 2. Patient will be able to perform a sit-to-stand transfer with no pain or limitation. 3. Patient will be able to walk 1/4 mile with no pain or limitation. Long Term Goals: To be accomplished in 16 treatments:  1. Patient will be able to walk 1 mile with no pain or limitation. 2. Patient will be able to squat through a full ROM with no pain or limitation. 3. Patient will be able to pick 10# from the floor with no pain or limitation. Frequency / Duration: Patient to be seen 2 times per week for 8 weeks. Patient/ Caregiver education and instruction: self care, activity modification and exercises    [x]  Plan of care has been reviewed with PTA    G-Codes (GP)  Mobility   Current  CJ= 20-39%   Goal  CJ= 20-39%    The severity rating is based on clinical judgment and the FOTO Score score. Certification Period: 2/27/18 - 5/27/18  Harinder James, PT , DPT, OCS, Cert. DN   2/27/2018 11:56 AM    ________________________________________________________________________    I certify that the above Therapy Services are being furnished while the patient is under my care. I agree with the treatment plan and certify that this therapy is necessary.     [de-identified] Signature:____________________  Date:____________Time: _________

## 2018-03-09 ENCOUNTER — HOSPITAL ENCOUNTER (OUTPATIENT)
Dept: PHYSICAL THERAPY | Age: 73
Discharge: HOME | End: 2018-03-09
Payer: MEDICARE

## 2018-03-09 PROCEDURE — 97110 THERAPEUTIC EXERCISES: CPT | Performed by: PHYSICAL THERAPIST

## 2018-03-09 NOTE — PROGRESS NOTES
PT DAILY TREATMENT NOTE - [facility] 2-15    Patient Name: Emile Sacks  Date:3/9/2018  : 1945  [x]  Patient  Verified  Payor: [place] MEDICARE / Plan: [place] MEDICARE PART A & B / Product Type: Medicare /    In time:10:30 AM  Out time:11:30 AM  Total Treatment Time (min): 60  Total Timed Codes (min): 60  1:1 Treatment Time ( only): 30   Visit #: 2     Treatment Area: Strain of muscle, fascia and tendon of lower back, initial encounter [S39.012A]    SUBJECTIVE  Pain Level (0-10 scale): 0  Any medication changes, allergies to medications, adverse drug reactions, diagnosis change, or new procedure performed?: [x] No    [] Yes (see summary sheet for update)  Subjective functional status/changes:   [] No changes reported  Patient has one more day on the Diclofenac and has no pain reported. OBJECTIVE    60 min Therapeutic Exercise:  [x] See flow sheet :   Rationale: increase ROM, increase strength and improve coordination to improve the patients ability to walk without pain          With   [] TE   [] TA   [] neuro   [] other: Patient Education: [x] Review HEP    [] Progressed/Changed HEP based on:   [] positioning   [] body mechanics   [] transfers   [] heat/ice application    [] other:      Other Objective/Functional Measures:      Pain Level (0-10 scale) post treatment: 0    ASSESSMENT/Changes in Function:     Patient will continue to benefit from skilled PT services to modify and progress therapeutic interventions, address functional mobility deficits, address ROM deficits, address strength deficits, analyze and address soft tissue restrictions, analyze and cue movement patterns, analyze and modify body mechanics/ergonomics and assess and modify postural abnormalities to attain remaining goals.      []  See Plan of Care  []  See progress note/recertification  []  See Discharge Summary         Progress towards goals / Updated goals:  Patient tolerated today's progression of therapeutic exercises very well and is showing excellent progress overall. PLAN  [x]  Upgrade activities as tolerated     [x]  Continue plan of care  []  Update interventions per flow sheet       []  Discharge due to:_  []  Other:_      Reva Lockwood PT , DPT, OCS, Cert.  DN   3/9/2018  12:30 PM

## 2018-03-15 ENCOUNTER — HOSPITAL ENCOUNTER (OUTPATIENT)
Dept: PHYSICAL THERAPY | Age: 73
Discharge: HOME | End: 2018-03-15
Payer: MEDICARE

## 2018-03-15 PROCEDURE — 97110 THERAPEUTIC EXERCISES: CPT | Performed by: PHYSICAL THERAPIST

## 2018-03-15 NOTE — PROGRESS NOTES
PT DAILY TREATMENT NOTE - [facility] 2-15    Patient Name: Anh Barahona  Date:3/15/2018  : 1945  [x]  Patient  Verified  Payor: Kalyn Cr / Plan: [place] MEDICARE PART A & B / Product Type: Medicare /    In time:1:50 PM  Out time:2:40 PM  Total Treatment Time (min): 50  Total Timed Codes (min): 40  1:1 Treatment Time ([address] only): 40  Visit #: 3     Treatment Area: Strain of muscle, fascia and tendon of lower back, initial encounter [S39.012A]    SUBJECTIVE  Pain Level (0-10 scale): 1  Any medication changes, allergies to medications, adverse drug reactions, diagnosis change, or new procedure performed?: [x] No    [] Yes (see summary sheet for update)  Subjective functional status/changes:   [] No changes reported  Patient reports some soreness and stiffness along middle aspect of her back. She also is no longer taking the Diclofenac. OBJECTIVE  Modalities: Moist heat along the lumbar spine in supine for 10 minutes to improve tissue extensibility and reduce pain. 40 min Therapeutic Exercise:  [x] See flow sheet :   Rationale: increase ROM, increase strength and improve coordination to improve the patients ability to walk without pain          With   [] TE   [] TA   [] neuro   [] other: Patient Education: [x] Review HEP    [] Progressed/Changed HEP based on:   [] positioning   [] body mechanics   [] transfers   [] heat/ice application    [] other:      Other Objective/Functional Measures:      Pain Level (0-10 scale) post treatment: 0    ASSESSMENT/Changes in Function:     Patient will continue to benefit from skilled PT services to modify and progress therapeutic interventions, address functional mobility deficits, address ROM deficits, address strength deficits, analyze and address soft tissue restrictions, analyze and cue movement patterns, analyze and modify body mechanics/ergonomics and assess and modify postural abnormalities to attain remaining goals.      []  See Plan of Care  []  See progress note/recertification  []  See Discharge Summary         Progress towards goals / Updated goals:  Patient tolerated today's progression of therapeutic exercises very well and will f/u on discharge planning next visit. PLAN  [x]  Upgrade activities as tolerated     [x]  Continue plan of care  []  Update interventions per flow sheet       []  Discharge due to:_  []  Other:_      Rosaline Dean, PT , DPT, OCS, Cert.  DN   3/15/2018  12:30 PM

## 2018-03-26 ENCOUNTER — HOSPITAL ENCOUNTER (OUTPATIENT)
Dept: PHYSICAL THERAPY | Age: 73
Discharge: HOME | End: 2018-03-26
Payer: MEDICARE

## 2018-03-26 PROCEDURE — 97110 THERAPEUTIC EXERCISES: CPT | Performed by: PHYSICAL THERAPIST

## 2018-03-26 PROCEDURE — G8979 MOBILITY GOAL STATUS: HCPCS | Performed by: PHYSICAL THERAPIST

## 2018-03-26 PROCEDURE — G8980 MOBILITY D/C STATUS: HCPCS | Performed by: PHYSICAL THERAPIST

## 2018-03-26 NOTE — PROGRESS NOTES
PT DAILY TREATMENT NOTE - [facility] 2-15    Patient Name: Robert Haider  Date:3/26/2018  : 1945  [x]  Patient  Verified  Payor: Shahnaz Liang / Plan: [place] MEDICARE PART A & B / Product Type: Medicare /    In time:10:00 AM  Out time:10:50  Total Treatment Time (min): 50  Total Timed Codes (min): 40  1:1 Treatment Time ( [address] only): 40  Visit #: 4    Treatment Area: Strain of muscle, fascia and tendon of lower back, initial encounter [S39.012A]    SUBJECTIVE  Pain Level (0-10 scale): 1  Any medication changes, allergies to medications, adverse drug reactions, diagnosis change, or new procedure performed?: [x] No    [] Yes (see summary sheet for update)  Subjective functional status/changes:   [] No changes reported  Patient reports an increase in big toe pain this morning for no apparent reason. The stretching has really helped improve the stiffness in her lower back and she is very happy with her progress. OBJECTIVE  Modalities: Moist heat along the lumbar spine in supine for 10 minutes to improve tissue extensibility and reduce pain.     40 min Therapeutic Exercise:  [x] See flow sheet :   Rationale: increase ROM, increase strength and improve coordination to improve the patients ability to walk without pain          With   [] TE   [] TA   [] neuro   [] other: Patient Education: [x] Review HEP    [] Progressed/Changed HEP based on:   [] positioning   [] body mechanics   [] transfers   [] heat/ice application    [] other:      Other Objective/Functional Measures:      Pain Level (0-10 scale) post treatment: 0    ASSESSMENT/Changes in Function:     Patient will continue to benefit from skilled PT services to modify and progress therapeutic interventions, address functional mobility deficits, address ROM deficits, address strength deficits, analyze and address soft tissue restrictions, analyze and cue movement patterns, analyze and modify body mechanics/ergonomics and assess and modify postural abnormalities to attain remaining goals. []  See Plan of Care  []  See progress note/recertification  []  See Discharge Summary         Progress towards goals / Updated goals:  Patient tolerated today's therapy with a focus on lumbopelvic stretching and avoided standing ther ex. Will likey discharge next visit. PLAN  [x]  Upgrade activities as tolerated     [x]  Continue plan of care  []  Update interventions per flow sheet       []  Discharge due to:_  []  Other:_      Keny Art, PT , DPT, OCS, Cert.  DN   3/26/2018  12:30 PM

## 2018-03-27 ENCOUNTER — HOSPITAL ENCOUNTER (OUTPATIENT)
Dept: LAB | Age: 73
Discharge: HOME | End: 2018-03-27
Payer: MEDICARE

## 2018-03-27 ENCOUNTER — OFFICE VISIT (OUTPATIENT)
Dept: INTERNAL MEDICINE CLINIC | Age: 73
End: 2018-03-27

## 2018-03-27 VITALS
DIASTOLIC BLOOD PRESSURE: 74 MMHG | OXYGEN SATURATION: 98 % | RESPIRATION RATE: 12 BRPM | WEIGHT: 169.8 LBS | HEIGHT: 65 IN | TEMPERATURE: 98 F | BODY MASS INDEX: 28.29 KG/M2 | HEART RATE: 78 BPM | SYSTOLIC BLOOD PRESSURE: 141 MMHG

## 2018-03-27 DIAGNOSIS — M10.9 GOUT OF BIG TOE: ICD-10-CM

## 2018-03-27 DIAGNOSIS — M79.675 GREAT TOE PAIN, LEFT: Primary | ICD-10-CM

## 2018-03-27 PROCEDURE — 36415 COLL VENOUS BLD VENIPUNCTURE: CPT

## 2018-03-27 PROCEDURE — 84550 ASSAY OF BLOOD/URIC ACID: CPT

## 2018-03-27 PROCEDURE — 80048 BASIC METABOLIC PNL TOTAL CA: CPT

## 2018-03-27 RX ORDER — INDOMETHACIN 50 MG/1
50 CAPSULE ORAL
Qty: 30 CAP | Refills: 2 | Status: SHIPPED | OUTPATIENT
Start: 2018-03-27 | End: 2018-03-27 | Stop reason: SDUPTHER

## 2018-03-27 NOTE — PROGRESS NOTES
HISTORY OF PRESENT ILLNESS  Wen Pendleton is a 74 y.o. female. HPI  Presents with complaints of sudden onset of right great toe pain that began 2 nights ago. Pain woke her from sleep; pain has increased with weight bearing and intensified yesterday to 9/10 on pain scale. Right foot has been swollen with redness and warmth to right great toe. Denies past history of Gout. Denies any recent injury to right foot; was raking leaves in her yard on 3/25 afternoon without any pain; symptoms began later that night. Admits that she tends not to drink enough water. Took Tylenol for pain without relief; took some Aleve this am which has given her some relief. Review of Systems   Constitutional: Positive for malaise/fatigue. Negative for chills and fever. Respiratory: Negative for cough and shortness of breath. Cardiovascular: Negative for chest pain and palpitations. Gastrointestinal: Negative for abdominal pain, nausea and vomiting. Genitourinary: Negative for dysuria, frequency and hematuria. Musculoskeletal: Positive for joint pain. Neurological: Negative for dizziness, tingling and headaches. /74 (BP 1 Location: Left arm, BP Patient Position: Sitting)  Pulse 78  Temp 98 °F (36.7 °C) (Oral)   Resp 12  Ht 5' 5\" (1.651 m)  Wt 169 lb 12.8 oz (77 kg)  SpO2 98%  BMI 28.26 kg/m2  Physical Exam   Constitutional: She appears well-developed and well-nourished. HENT:   Head: Normocephalic and atraumatic. Neck: Normal range of motion. Neck supple. Cardiovascular: Normal rate and regular rhythm. Pulmonary/Chest: Effort normal and breath sounds normal.   Abdominal: Soft. Bowel sounds are normal.   Musculoskeletal:        Right foot: There is decreased range of motion, tenderness and swelling. Feet:    Edema and erythema to right forefoot; pain to right 1st MTP joint but rest of forefoot nontender   Psychiatric: She has a normal mood and affect.  Her behavior is normal.   Nursing note and vitals reviewed. ASSESSMENT and PLAN  Diagnoses and all orders for this visit:    1. Great toe pain, left -- Rx for Indomethacin 50 mg 1 tab three times daily as needed escribed to patient's pharmacy. Will send out uric acid level now while flaring. If symptoms fail to improve, consider right foot xray  -     URIC ACID  -     METABOLIC PANEL, BASIC    2.  Gout of big toe  -     URIC ACID  -     METABOLIC PANEL, BASIC      lab results and schedule of future lab studies reviewed with patient  reviewed diet, exercise and weight control  reviewed medications and side effects in detail

## 2018-03-27 NOTE — MR AVS SNAPSHOT
[facility] 
 
 
 [address] 
[phone] Patient: Azul Rajan MRN: [id number] WFB:[id number] Visit Information Date & Time Provider Department Dept. Phone Encounter #  
 3/27/2018 10:40 AM Claudia Mobley NP [facility] [address] [id number] Upcoming Health Maintenance Date Due DTaP/Tdap/Td series (1 - Tdap) 1/12/1966 ZOSTER VACCINE AGE 60> 11/12/2002 GLAUCOMA SCREENING Q2Y 8/19/2018 MEDICARE YEARLY EXAM 10/4/2018 Allergies as of 3/27/2018  Review Complete On: 3/27/2018 By: Claudia Mobley NP No Known Allergies Current Immunizations  Reviewed on 12/10/2014 Name Date Influenza High Dose Vaccine PF 10/3/2017, 9/18/2016, 9/30/2015 Influenza Vaccine 11/1/2014 Pneumococcal Conjugate (PCV-13) 8/7/2015 Pneumococcal Vaccine (Unspecified Type) 12/10/2012 Not reviewed this visit You Were Diagnosed With   
  
 Codes Comments Acute idiopathic gout involving toe of right foot    -  Primary ICD-10-CM: M10.071 ICD-9-CM: 274.01 Vitals BP Pulse Temp Resp Height(growth percentile) Weight(growth percentile) 141/74 (BP 1 Location: Left arm, BP Patient Position: Sitting) 78 98 °F (36.7 °C) (Oral) 12 5' 5\" (1.651 m) 169 lb 12.8 oz (77 kg) SpO2 BMI OB Status Smoking Status 98% 28.26 kg/m2 Hysterectomy Never Smoker BMI and BSA Data Body Mass Index Body Surface Area  
 28.26 kg/m 2 1.88 m 2 Preferred Pharmacy Pharmacy Name Phone [facility] [address]  [facility] [phone] Your Updated Medication List  
  
   
This list is accurate as of 3/27/18 11:01 AM.  Always use your most recent med list.  
  
  
  
  
 Baronel Vendor D 315-200 mg-unit Tab Generic drug:  calcium citrate-vitamin d3 Take 1 Tab by mouth daily (with breakfast). COSAMIN DS PO Take 1 Tab by mouth daily. estradiol 0.5 mg tablet Commonly known as:  ESTRACE  
TAKE 1 TABLET BY MOUTH DAILY  
  
 indomethacin 50 mg capsule Commonly known as:  INDOCIN Take 1 Cap by mouth three (3) times daily as needed for up to 90 days. melatonin 3 mg tablet Take 1 Tab by mouth nightly. multivitamin tablet Commonly known as:  ONE A DAY Take 1 Tab by mouth BID Mon Wed & Fri.  
  
 OTHER  
  
 VITAMIN D3 1,000 unit Cap Generic drug:  cholecalciferol Take 1,000 Units by mouth daily. Prescriptions Sent to Pharmacy Refills  
 indomethacin (INDOCIN) 50 mg capsule 2 Sig: Take 1 Cap by mouth three (3) times daily as needed for up to 90 days. Class: Normal  
 Pharmacy: [facility] [address] [place] [address] [place] AT [facility] [address] ([facility] Ph #: [phone] Route: Oral  
  
We Performed the Following METABOLIC PANEL, BASIC [42287 CPT(R)] URIC ACID [id number] CPT(R)] To-Do List   
 04/02/2018 10:00 AM  
  Appointment with Keny Art PT at [facility] [address] ([phone]) Please remember to arrive at the hospital at least 30 minutes prior to your scheduled appointment time. When you come in for your appointment, please be sure to bring the therapy prescription the doctor gave you, your insurance card, and a list of the medicines you are taking. Also, please remember to wear comfortable, loose- fitting clothes. We look forward to seeing you. Patient Instructions Gout: Care Instructions Your Care Instructions Gout is a form of arthritis caused by a buildup of uric acid crystals in a joint. It causes sudden attacks of pain, swelling, redness, and stiffness, usually in one joint, especially the big toe. Gout usually comes on without a cause. But it can be brought on by drinking alcohol (especially beer) or eating seafood and red meat.  Taking certain medicines, such as diuretics or aspirin, also can bring on an attack of gout. Taking your medicines as prescribed and following up with your doctor regularly can help you avoid gout attacks in the future. Follow-up care is a key part of your treatment and safety. Be sure to make and go to all appointments, and call your doctor if you are having problems. It's also a good idea to know your test results and keep a list of the medicines you take. How can you care for yourself at home? · If the joint is swollen, put ice or a cold pack on the area for 10 to 20 minutes at a time. Put a thin cloth between the ice and your skin. · Prop up the sore limb on a pillow when you ice it or anytime you sit or lie down during the next 3 days. Try to keep it above the level of your heart. This will help reduce swelling. · Rest sore joints. Avoid activities that put weight or strain on the joints for a few days. Take short rest breaks from your regular activities during the day. · Take your medicines exactly as prescribed. Call your doctor if you think you are having a problem with your medicine. · Take pain medicines exactly as directed. ¨ If the doctor gave you a prescription medicine for pain, take it as prescribed. ¨ If you are not taking a prescription pain medicine, ask your doctor if you can take an over-the-counter medicine. · Eat less seafood and red meat. · Check with your doctor before drinking alcohol. · Losing weight, if you are overweight, may help reduce attacks of gout. But do not go on a [employer]. \" Losing a lot of weight in a short amount of time can cause a gout attack. When should you call for help? Call your doctor now or seek immediate medical care if: 
? · You have a fever. ? · The joint is so painful you cannot use it. ? · You have sudden, unexplained swelling, redness, warmth, or severe pain in one or more joints. ?Watch closely for changes in your health, and be sure to contact your doctor if: 
? · You have joint pain. ? · Your symptoms get worse or are not improving after 2 or 3 days. Where can you learn more? Go to http://ihsan-lilian.info/. Enter X742 in the search box to learn more about \"Gout: Care Instructions. \" Current as of: October 31, 2016 Content Version: 11.4 © 0745-3582 [employer]. Care instructions adapted under license by [employer] (which disclaims liability or warranty for this information). If you have questions about a medical condition or this instruction, always ask your healthcare professional. [address] any warranty or liability for your use of this information. Purine-Restricted Diet: Care Instructions Your Care Instructions Purines are substances that are found in some foods. Your body turns purines into uric acid. High levels of uric acid can cause gout, which is a form of arthritis that causes pain and inflammation in joints. You may be able to help control the amount of uric acid in your body by limiting high-purine foods in your diet. Follow-up care is a key part of your treatment and safety. Be sure to make and go to all appointments, and call your doctor if you are having problems. It's also a good idea to know your test results and keep a list of the medicines you take. How can you care for yourself at home? · Plan your meals and snacks around foods that are low in purines and are safe for you to eat. These foods include: ¨ Green vegetables and tomatoes. ¨ Fruits. ¨ Whole-grain breads, rice, and cereals. ¨ Eggs, peanut butter, and nuts. ¨ Low-fat milk, cheese, and other milk products. ¨ Popcorn. ¨ Gelatin desserts, chocolate, cocoa, and cakes and sweets, in small amounts. · You can eat certain foods that are medium-high in purines, but eat them only once in a while. These foods include: ¨ Legumes, such as dried beans and dried peas. You can have 1 cup cooked legumes each day. ¨ Asparagus, cauliflower, spinach, mushrooms, and green peas. ¨ Fish and seafood (other than very high-purine seafood). ¨ Oatmeal, wheat bran, and wheat germ. · Limit very high-purine foods, including: ¨ Organ meats, such as liver, kidneys, sweetbreads, and brains. ¨ Meats, including olson, beef, pork, and lamb. ¨ Game meats and any other meats in large amounts. ¨ Anchovies, sardines, herring, mackerel, and scallops. ¨ Gravy. ¨ Beer. Where can you learn more? Go to http://ihsan-lilian.info/. Enter F448 in the search box to learn more about \"Purine-Restricted Diet: Care Instructions. \" Current as of: May 12, 2017 Content Version: 11.4 © 8258-3050 [employer]. Care instructions adapted under license by [employer] (which disclaims liability or warranty for this information). If you have questions about a medical condition or this instruction, always ask your healthcare professional. [place] [address] any warranty or liability for your use of this information. Introducing [facility]! Dear Sandra Fleming: 
Thank you for requesting a KidZui account. Our records indicate that you already have an active KidZui account. You can access your account anytime at [email or web address] Did you know that you can access your hospital and ER discharge instructions at any time in KidZui? You can also review all of your test results from your hospital stay or ER visit. Additional Information If you have questions, please visit the Frequently Asked Questions section of the KidZui website at [email or web address] Remember, KidZui is NOT to be used for urgent needs. For medical emergencies, dial 911. Now available from your iPhone and Android! Please provide this summary of care documentation to your next provider. Your primary care clinician is listed as Jason Obrien. If you have any questions after today's visit, please call [phone].

## 2018-03-27 NOTE — PATIENT INSTRUCTIONS
Gout: Care Instructions  Your Care Instructions    Gout is a form of arthritis caused by a buildup of uric acid crystals in a joint. It causes sudden attacks of pain, swelling, redness, and stiffness, usually in one joint, especially the big toe. Gout usually comes on without a cause. But it can be brought on by drinking alcohol (especially beer) or eating seafood and red meat. Taking certain medicines, such as diuretics or aspirin, also can bring on an attack of gout. Taking your medicines as prescribed and following up with your doctor regularly can help you avoid gout attacks in the future. Follow-up care is a key part of your treatment and safety. Be sure to make and go to all appointments, and call your doctor if you are having problems. It's also a good idea to know your test results and keep a list of the medicines you take. How can you care for yourself at home? · If the joint is swollen, put ice or a cold pack on the area for 10 to 20 minutes at a time. Put a thin cloth between the ice and your skin. · Prop up the sore limb on a pillow when you ice it or anytime you sit or lie down during the next 3 days. Try to keep it above the level of your heart. This will help reduce swelling. · Rest sore joints. Avoid activities that put weight or strain on the joints for a few days. Take short rest breaks from your regular activities during the day. · Take your medicines exactly as prescribed. Call your doctor if you think you are having a problem with your medicine. · Take pain medicines exactly as directed. ¨ If the doctor gave you a prescription medicine for pain, take it as prescribed. ¨ If you are not taking a prescription pain medicine, ask your doctor if you can take an over-the-counter medicine. · Eat less seafood and red meat. · Check with your doctor before drinking alcohol. · Losing weight, if you are overweight, may help reduce attacks of gout. But do not go on a [employer]. \" Losing a lot of weight in a short amount of time can cause a gout attack. When should you call for help? Call your doctor now or seek immediate medical care if:  ? · You have a fever. ? · The joint is so painful you cannot use it. ? · You have sudden, unexplained swelling, redness, warmth, or severe pain in one or more joints. ? Watch closely for changes in your health, and be sure to contact your doctor if:  ? · You have joint pain. ? · Your symptoms get worse or are not improving after 2 or 3 days. Where can you learn more? Go to http://ihsan-lilian.info/. Enter L196 in the search box to learn more about \"Gout: Care Instructions. \"  Current as of: October 31, 2016  Content Version: 11.4  © 7100-7639 [employer]. Care instructions adapted under license by [employer] (which disclaims liability or warranty for this information). If you have questions about a medical condition or this instruction, always ask your healthcare professional. [place] [address] any warranty or liability for your use of this information. Purine-Restricted Diet: Care Instructions  Your Care Instructions    Purines are substances that are found in some foods. Your body turns purines into uric acid. High levels of uric acid can cause gout, which is a form of arthritis that causes pain and inflammation in joints. You may be able to help control the amount of uric acid in your body by limiting high-purine foods in your diet. Follow-up care is a key part of your treatment and safety. Be sure to make and go to all appointments, and call your doctor if you are having problems. It's also a good idea to know your test results and keep a list of the medicines you take. How can you care for yourself at home? · Plan your meals and snacks around foods that are low in purines and are safe for you to eat. These foods include:  ¨ Green vegetables and tomatoes. ¨ Fruits.   ¨ Whole-grain breads, rice, and cereals. ¨ Eggs, peanut butter, and nuts. ¨ Low-fat milk, cheese, and other milk products. ¨ Popcorn. ¨ Gelatin desserts, chocolate, cocoa, and cakes and sweets, in small amounts. · You can eat certain foods that are medium-high in purines, but eat them only once in a while. These foods include:  ¨ Legumes, such as dried beans and dried peas. You can have 1 cup cooked legumes each day. ¨ Asparagus, cauliflower, spinach, mushrooms, and green peas. ¨ Fish and seafood (other than very high-purine seafood). ¨ Oatmeal, wheat bran, and wheat germ. · Limit very high-purine foods, including:  ¨ Organ meats, such as liver, kidneys, sweetbreads, and brains. ¨ Meats, including olson, beef, pork, and lamb. ¨ Game meats and any other meats in large amounts. ¨ Anchovies, sardines, herring, mackerel, and scallops. ¨ Gravy. ¨ Beer. Where can you learn more? Go to http://ihsan-lilian.info/. Enter F448 in the search box to learn more about \"Purine-Restricted Diet: Care Instructions. \"  Current as of: May 12, 2017  Content Version: 11.4  © 0167-9812 [employer]. Care instructions adapted under license by [employer] (which disclaims liability or warranty for this information). If you have questions about a medical condition or this instruction, always ask your healthcare professional. [place] [address] any warranty or liability for your use of this information.

## 2018-03-28 LAB
BUN SERPL-MCNC: 25 MG/DL (ref 8–27)
BUN/CREAT SERPL: 32 (ref 12–28)
CALCIUM SERPL-MCNC: 9.5 MG/DL (ref 8.7–10.3)
CHLORIDE SERPL-SCNC: 100 MMOL/L (ref 96–106)
CO2 SERPL-SCNC: 21 MMOL/L (ref 18–29)
CREAT SERPL-MCNC: 0.77 MG/DL (ref 0.57–1)
GFR SERPLBLD CREATININE-BSD FMLA CKD-EPI: 76 ML/MIN/1.73
GFR SERPLBLD CREATININE-BSD FMLA CKD-EPI: 87 ML/MIN/1.73
GLUCOSE SERPL-MCNC: 80 MG/DL (ref 65–99)
POTASSIUM SERPL-SCNC: 4.3 MMOL/L (ref 3.5–5.2)
SODIUM SERPL-SCNC: 141 MMOL/L (ref 134–144)
URATE SERPL-MCNC: 4.2 MG/DL (ref 2.5–7.1)

## 2018-04-02 ENCOUNTER — APPOINTMENT (OUTPATIENT)
Dept: PHYSICAL THERAPY | Age: 73
End: 2018-04-02

## 2018-04-02 ENCOUNTER — TELEPHONE (OUTPATIENT)
Dept: INTERNAL MEDICINE CLINIC | Age: 73
End: 2018-04-02

## 2018-04-05 DIAGNOSIS — S39.012A LUMBAR STRAIN, INITIAL ENCOUNTER: ICD-10-CM

## 2018-04-05 RX ORDER — DICLOFENAC SODIUM 75 MG/1
75 TABLET, DELAYED RELEASE ORAL 2 TIMES DAILY
Qty: 60 TAB | Refills: 1 | Status: SHIPPED | OUTPATIENT
Start: 2018-04-05 | End: 2018-06-15 | Stop reason: ALTCHOICE

## 2018-04-05 RX ORDER — CYCLOBENZAPRINE HCL 10 MG
10 TABLET ORAL
Qty: 30 TAB | Refills: 1 | Status: SHIPPED | OUTPATIENT
Start: 2018-04-05 | End: 2018-06-15 | Stop reason: ALTCHOICE

## 2018-05-03 NOTE — PROGRESS NOTES
[address] [facility] [address] [facility] Mamadou Peña  Phone: [phone] Fax: [phone]      Discharge Summary 2-15    Patient name: Jack Gould  : 1945  Provider#: [id number]  Referral source: Nitin Lawler MD      Medical/Treatment Diagnosis: Strain of muscle, fascia and tendon of lower back, initial encounter [S39.349A]     Prior Hospitalization: see medical history     Comorbidities: See Plan of Care  Prior Level of Function: See Plan of Care  Medications: Verified on Patient Summary List    Start of Care: 18      Onset Date:18   Visits from Start of Care: 4     Missed Visits: 0  Reporting Period : 18 to 3/26/18    Assessment/Summary of care: Mrs. Puneet Fofana did very well with PT with a gradual progression of therapeutic exercises that led to an achievement of prior level of function and all long term goals met. She has not needed to return to the clinic over the past 6 weeks and will be discharged. Short Term Goals: To be accomplished in 8 treatments:  1. Patient will be able to bend forward and tie her shoes with no pain or limitation. Met.  2. Patient will be able to perform a sit-to-stand transfer with no pain or limitation. Met.  3. Patient will be able to walk 1/4 mile with no pain or limitation. Met. Long Term Goals: To be accomplished in 16 treatments:  1. Patient will be able to walk 1 mile with no pain or limitation. Met.  2. Patient will be able to squat through a full ROM with no pain or limitation. Met.  3. Patient will be able to pick 10# from the floor with no pain or limitation. Met.  G-Codes (GP)  Mobility   [id number] Goal  CJ= 20-39%  D/C  CJ= 20-39%    The severity rating is based on clinical judgment and the FOTO Score score.     RECOMMENDATIONS:  [x]Discontinue therapy: [x]Patient has reached or is progressing toward set goals     []Patient is non-compliant or has abdicated     []Due to lack of appreciable progress towards set goals     []Other  Gayl Sylvia, PT , DPT, OCS, Cert.  DN   5/3/2018 2:09 PM

## 2018-05-16 ENCOUNTER — TELEPHONE (OUTPATIENT)
Dept: INTERNAL MEDICINE CLINIC | Age: 73
End: 2018-05-16

## 2018-05-16 NOTE — TELEPHONE ENCOUNTER
----- Message from Hu Cantrell sent at 5/16/2018 10:52 AM EDT -----  Regarding: Dr Najera/Phone  Pt has appt in Oct for Medicare PE, does she need to fast?  Her number is [phone]

## 2018-06-15 ENCOUNTER — OFFICE VISIT (OUTPATIENT)
Dept: INTERNAL MEDICINE CLINIC | Age: 73
End: 2018-06-15

## 2018-06-15 VITALS
WEIGHT: 169.13 LBS | TEMPERATURE: 98 F | DIASTOLIC BLOOD PRESSURE: 69 MMHG | HEIGHT: 65 IN | OXYGEN SATURATION: 94 % | HEART RATE: 72 BPM | RESPIRATION RATE: 18 BRPM | BODY MASS INDEX: 28.18 KG/M2 | SYSTOLIC BLOOD PRESSURE: 150 MMHG

## 2018-06-15 DIAGNOSIS — Z78.0 MENOPAUSE: ICD-10-CM

## 2018-06-15 DIAGNOSIS — R13.10 ODYNOPHAGIA: Primary | ICD-10-CM

## 2018-06-15 DIAGNOSIS — K21.9 GASTROESOPHAGEAL REFLUX DISEASE WITHOUT ESOPHAGITIS: ICD-10-CM

## 2018-06-15 RX ORDER — NAPROXEN SODIUM 220 MG
220 TABLET ORAL 2 TIMES DAILY WITH MEALS
COMMUNITY

## 2018-06-15 RX ORDER — OMEPRAZOLE 20 MG/1
20 CAPSULE, DELAYED RELEASE ORAL DAILY
Qty: 30 CAP | Refills: 0 | COMMUNITY
Start: 2018-06-15 | End: 2018-09-07 | Stop reason: ALTCHOICE

## 2018-06-15 NOTE — PATIENT INSTRUCTIONS

## 2018-06-15 NOTE — PROGRESS NOTES
HISTORY OF PRESENT ILLNESS  Lang Butterfield is a 74 y.o. female. HPI  gastroesophageal reflux disease history:  she complains of acid reflux symptoms including odynophagia and some globus in upper chest/ neck with meals for 8 month(s), intermittent over this time. Symptoms are mild. she denies abdominal pain, weight loss, black stools, heartburn, regurgitation or clearing throat. Social history is significant for no or minimal alcohol, nonsmoker, no or mild caffeine use. she takes aleve weekly. Using prn zantac or tums with delayed. Patient Active Problem List   Diagnosis Code    Basal cell cancer C44.91    RBBB I45.10    Advanced care planning/counseling discussion Z71.89     Past Medical History:   Diagnosis Date    Achilles tendinitis 2015    Arthritis     knees    Falling hair     Fatigue     Insomnia     Joint pain     Plantar fasciitis      No Known Allergies  Current Outpatient Prescriptions on File Prior to Visit   Medication Sig Dispense Refill    OTHER       melatonin 3 mg tablet Take 1 Tab by mouth nightly.  estradiol (ESTRACE) 0.5 mg tablet TAKE 1 TABLET BY MOUTH DAILY 90 Tab 1    calcium citrate-vitamin d3 (CITRACAL + D) 315-200 mg-unit tab Take 1 Tab by mouth daily (with breakfast).  multivitamin (ONE A DAY) tablet Take 1 Tab by mouth BID Mon Wed & Fri.  Cholecalciferol, Vitamin D3, (VITAMIN D3) 1,000 unit cap Take 1,000 Units by mouth daily. No current facility-administered medications on file prior to visit. Social History   Substance Use Topics    Smoking status: Never Smoker    Smokeless tobacco: Never Used    Alcohol use 1.2 - 1.8 oz/week     2 - 3 Glasses of wine per week      Comment: social         Review of Systems   Constitutional: Negative for weight loss. Respiratory: Negative for cough. Cardiovascular: Negative for chest pain.    Gastrointestinal: Negative for abdominal pain, blood in stool, constipation, diarrhea, heartburn, melena, nausea and vomiting. Genitourinary: Negative. Physical Exam   Constitutional: She appears well-developed and well-nourished. No distress. /69 (BP 1 Location: Left arm, BP Patient Position: Sitting)  Pulse 72  Temp 98 °F (36.7 °C) (Oral)   Resp 18  Ht 5' 5\" (1.651 m)  Wt 169 lb 2 oz (76.7 kg)  SpO2 94%  BMI 28.14 kg/m2Body mass index is 28.14 kg/(m^2). HENT:   Mouth/Throat: Oropharynx is clear and moist and mucous membranes are normal. No oropharyngeal exudate, posterior oropharyngeal edema, posterior oropharyngeal erythema or tonsillar abscesses. Neck: No JVD present. Carotid bruit is not present. Cardiovascular: Normal rate, regular rhythm, normal heart sounds and intact distal pulses. Pulmonary/Chest: Effort normal and breath sounds normal.   Abdominal: Soft. Normal appearance and bowel sounds are normal. There is no tenderness. Musculoskeletal: She exhibits no edema. Neurological: She is alert. Skin: Skin is warm and dry. She is not diaphoretic. Nursing note and vitals reviewed. ASSESSMENT and PLAN    ICD-10-CM ICD-9-CM    1. Odynophagia -but no dysphagia. No other alarm symptoms. likely related to esophagitis. Will empirically treat with PPI for 4 weeks. If not resolved, she'll need GI consult for EGD. The patient was given written instructions detailing her diagnoses and recommendations made today at the visit. R13.10 787.20 omeprazole (PRILOSEC) 20 mg capsule   2. Gastroesophageal reflux disease without esophagitis K21.9 530.81 omeprazole (PRILOSEC) 20 mg capsule     Follow-up Disposition:  Return in about 4 weeks (around 7/13/2018), or if symptoms worsen or fail to improve.

## 2018-06-15 NOTE — MR AVS SNAPSHOT
Rajni Breeding 
 
 
 [address] [facility] 1900 [facility] 
[phone] Patient: Meghan Carballo MRN: [id number] Visit Information Date & Time Provider Department Dept. Phone Encounter #  
 6/15/2018  3:00 PM Rollo Cranker, MD [facility] [id number] Follow-up Instructions Return in about 4 weeks (around 7/13/2018), or if symptoms worsen or fail to improve. Your Appointments 10/4/2018  9:15 AM  
Medicare Physical with Rollo Cranker, MD  
[facility] [address]) Appt Note: Medicare Wellness PE, ncp 5/16/18  
 [address] [address]  
[phone]  
  
   
 [address] [facility] [address] Upcoming Health Maintenance Date Due DTaP/Tdap/Td series (1 - Tdap) 1/12/1964 ZOSTER VACCINE AGE 60> 11/12/2002 Influenza Age 5 to Adult 8/1/2018 GLAUCOMA SCREENING Q2Y 8/19/2018 MEDICARE YEARLY EXAM 10/4/2018 Allergies as of 6/15/2018  Review Complete On: 6/15/2018 By: Robert Valenzuela LPN No Known Allergies Current Immunizations  Reviewed on 12/10/2014 Name Date Influenza High Dose Vaccine PF 10/3/2017, 9/18/2016, 9/30/2015 Influenza Vaccine 11/1/2014 Pneumococcal Conjugate (PCV-13) 8/7/2015 Pneumococcal Vaccine (Unspecified Type) 12/10/2012 Not reviewed this visit You Were Diagnosed With   
  
 Codes Comments Odynophagia    -  Primary ICD-10-CM: R13.10 ICD-9-CM: 787.20 Gastroesophageal reflux disease without esophagitis     ICD-10-CM: K21.9 ICD-9-CM: 530.81 Vitals BP Pulse Temp Resp Height(growth percentile) Weight(growth percentile) 150/69 (BP 1 Location: Left arm, BP Patient Position: Sitting) 72 98 °F (36.7 °C) (Oral) 18 5' 5\" (1.651 m) 169 lb 2 oz (76.7 kg) SpO2 BMI OB Status Smoking Status 94% 28.14 kg/m2 Hysterectomy Never Smoker Vitals History BMI and BSA Data Body Mass Index Body Surface Area  
 28.14 kg/m 2 1.88 m 2 Preferred Pharmacy Pharmacy Name Phone [facility] [address]  [facility] [phone] Your Updated Medication List  
  
   
This list is accurate as of 6/15/18  3:13 PM.  Always use your most recent med list.  
  
  
  
  
 ALEVE 220 mg tablet Generic drug:  naproxen sodium Take 220 mg by mouth two (2) times daily (with meals). CITRACAL PLUS D 315-200 mg-unit Tab Generic drug:  calcium citrate-vitamin d3 Take 1 Tab by mouth daily (with breakfast). estradiol 0.5 mg tablet Commonly known as:  ESTRACE  
TAKE 1 TABLET BY MOUTH DAILY  
  
 melatonin 3 mg tablet Take 1 Tab by mouth nightly. multivitamin tablet Commonly known as:  ONE A DAY Take 1 Tab by mouth BID Mon Wed & Fri.  
  
 omeprazole 20 mg capsule Commonly known as:  PRILOSEC Take 1 Cap by mouth daily. OTHER  
  
 VITAMIN D3 1,000 unit Cap Generic drug:  cholecalciferol Take 1,000 Units by mouth daily. Follow-up Instructions Return in about 4 weeks (around 7/13/2018), or if symptoms worsen or fail to improve. Patient Instructions Gastroesophageal Reflux Disease (GERD): Care Instructions Your Care Instructions Gastroesophageal reflux disease (GERD) is the backward flow of stomach acid into the esophagus. The esophagus is the tube that leads from your throat to your stomach. A one-way valve prevents the stomach acid from moving up into this tube. When you have GERD, this valve does not close tightly enough. If you have mild GERD symptoms including heartburn, you may be able to control the problem with antacids or over-the-counter medicine.  Changing your diet, losing weight, and making other lifestyle changes can also help reduce symptoms. Follow-up care is a key part of your treatment and safety. Be sure to make and go to all appointments, and call your doctor if you are having problems. It's also a good idea to know your test results and keep a list of the medicines you take. How can you care for yourself at home? · Take your medicines exactly as prescribed. Call your doctor if you think you are having a problem with your medicine. · Your doctor may recommend over-the-counter medicine. For mild or occasional indigestion, antacids, such as Tums, Gaviscon, Mylanta, or Maalox, may help. Your doctor also may recommend over-the-counter acid reducers, such as Pepcid AC, Tagamet HB, Zantac 75, or Prilosec. Read and follow all instructions on the label. If you use these medicines often, talk with your doctor. · Change your eating habits. ¨ It's best to eat several small meals instead of two or three large meals. ¨ After you eat, wait 2 to 3 hours before you lie down. ¨ Chocolate, mint, and alcohol can make GERD worse. ¨ Spicy foods, foods that have a lot of acid (like tomatoes and oranges), and coffee can make GERD symptoms worse in some people. If your symptoms are worse after you eat a certain food, you may want to stop eating that food to see if your symptoms get better. · Do not smoke or chew tobacco. Smoking can make GERD worse. If you need help quitting, talk to your doctor about stop-smoking programs and medicines. These can increase your chances of quitting for good. · If you have GERD symptoms at night, raise the head of your bed 6 to 8 inches by putting the frame on blocks or placing a foam wedge under the head of your mattress. (Adding extra pillows does not work.) · Do not wear tight clothing around your middle. · Lose weight if you need to. Losing just 5 to 10 pounds can help. When should you call for help? Call your doctor now or seek immediate medical care if: 
? · You have new or different belly pain. ? · Your stools are black and tarlike or have streaks of blood. ? Watch closely for changes in your health, and be sure to contact your doctor if: 
? · Your symptoms have not improved after 2 days. ? · Food seems to catch in your throat or chest.  
Where can you learn more? Go to http://ihsan-lilian.info/. Enter D735 in the search box to learn more about \"Gastroesophageal Reflux Disease (GERD): Care Instructions. \" Current as of: May 12, 2017 Content Version: 11.4 © 7436-9698 "[employer]". Care instructions adapted under license by [employer] (which disclaims liability or warranty for this information). If you have questions about a medical condition or this instruction, always ask your healthcare professional. [address] any warranty or liability for your use of this information. Introducing [facility] & HEALTH SERVICES! Dear Kasey Coulter: 
Thank you for requesting a CleanBeeBaby account. Our records indicate that you already have an active CleanBeeBaby account. You can access your account anytime at [email or web address] Did you know that you can access your hospital and ER discharge instructions at any time in CleanBeeBaby? You can also review all of your test results from your hospital stay or ER visit. Additional Information If you have questions, please visit the Frequently Asked Questions section of the CleanBeeBaby website at [email or web address] Remember, CleanBeeBaby is NOT to be used for urgent needs. For medical emergencies, dial 911. Now available from your iPhone and Android! Please provide this summary of care documentation to your next provider. Your primary care clinician is listed as Kar Pastrana. If you have any questions after today's visit, please call [phone].

## 2018-06-18 RX ORDER — ESTRADIOL 0.5 MG/1
0.5 TABLET ORAL DAILY
Qty: 90 TAB | Refills: 1 | Status: SHIPPED | OUTPATIENT
Start: 2018-06-18 | End: 2018-10-04 | Stop reason: SDUPTHER

## 2018-06-18 NOTE — TELEPHONE ENCOUNTER
From: Angelica Ray  To: Symone Bui MD  Sent: 6/15/2018 8:07 PM EDT  Subject: Medication Renewal Request    Original authorizing provider: MD Bari Medrano would like a refill of the following medications:  estradiol (ESTRACE) 0.5 mg tablet Symone Bui MD]    Preferred pharmacy: [address] [place], [place] [address]FARIDEH ALLENMAYA AT [address] [facility] [address] ([facility]    Comment:

## 2018-06-29 ENCOUNTER — TELEPHONE (OUTPATIENT)
Dept: INTERNAL MEDICINE CLINIC | Age: 73
End: 2018-06-29

## 2018-06-29 ENCOUNTER — OFFICE VISIT (OUTPATIENT)
Dept: INTERNAL MEDICINE CLINIC | Age: 73
End: 2018-06-29

## 2018-06-29 VITALS
HEART RATE: 85 BPM | SYSTOLIC BLOOD PRESSURE: 130 MMHG | WEIGHT: 168.6 LBS | BODY MASS INDEX: 28.09 KG/M2 | TEMPERATURE: 98 F | DIASTOLIC BLOOD PRESSURE: 71 MMHG | RESPIRATION RATE: 14 BRPM | HEIGHT: 65 IN | OXYGEN SATURATION: 97 %

## 2018-06-29 DIAGNOSIS — K21.9 GASTROESOPHAGEAL REFLUX DISEASE, ESOPHAGITIS PRESENCE NOT SPECIFIED: ICD-10-CM

## 2018-06-29 DIAGNOSIS — R19.7 DIARRHEA, UNSPECIFIED TYPE: Primary | ICD-10-CM

## 2018-06-29 RX ORDER — PANTOPRAZOLE SODIUM 20 MG/1
20 TABLET, DELAYED RELEASE ORAL DAILY
Qty: 30 TAB | Refills: 1 | Status: SHIPPED | OUTPATIENT
Start: 2018-06-29 | End: 2018-09-07 | Stop reason: ALTCHOICE

## 2018-06-29 RX ORDER — RANITIDINE 150 MG/1
150 TABLET, FILM COATED ORAL 2 TIMES DAILY
Qty: 60 TAB | Refills: 0
Start: 2018-06-29 | End: 2018-09-07 | Stop reason: SINTOL

## 2018-06-29 NOTE — TELEPHONE ENCOUNTER
I spoke with patient, she has been having diarrhea since Sunday 06/24/18. She just started taking Prilosec on 06/16 and she isn't sure if its a side effect. She has lower left back pain and Thursday she vomited once.

## 2018-06-29 NOTE — PATIENT INSTRUCTIONS

## 2018-06-29 NOTE — TELEPHONE ENCOUNTER
I called pt on both contact numbers, no answer. LM on  calling in regards to her sx that she is coming in for.

## 2018-06-29 NOTE — PROGRESS NOTES
HISTORY OF PRESENT ILLNESS  Juaquin Carmichael is a 74 y.o. female. HPI  Presents with complaints of diarrhea that began 6/24. Recently started course of Omeprazole for GERD on 6/16 and since then she has had some bloating and then started having diarrhea which was loose initially but then became more watery. Has had multiple watery to loose stools over this past week and had episode of nausea and vomiting yesterday. Was having some lower back pain and lower abdominal pain that ceased once she vomited and have not returned. She stopped taking the Omeprazole 3 days ago due to concerns about possible side effects of diarrhea. In between episodes of diarrhea, she has felt fine and was eating and drinking normally. Has not had any diarrhea since she stopped taking Omeprazole. Denies fever, chills, blood in stools. Has been noting return of heartburn since she stopped Omeprazole; has some Zantac at home. Review of Systems   Constitutional: Negative for chills, fever and malaise/fatigue. HENT: Negative for congestion. Respiratory: Negative for cough and shortness of breath. Cardiovascular: Negative for chest pain and palpitations. Gastrointestinal: Positive for abdominal pain, diarrhea, heartburn, nausea and vomiting. Negative for blood in stool, constipation and melena. Genitourinary: Negative for dysuria and frequency. Musculoskeletal: Negative for myalgias. Skin: Negative for rash. Neurological: Negative for dizziness, tingling and headaches. /71 (BP 1 Location: Left arm, BP Patient Position: Sitting)  Pulse 85  Temp 98 °F (36.7 °C) (Oral)   Resp 14  Ht 5' 5\" (1.651 m)  Wt 168 lb 9.6 oz (76.5 kg)  SpO2 97%  BMI 28.06 kg/m2  Physical Exam   Constitutional: She is oriented to person, place, and time. She appears well-developed and well-nourished. No distress. HENT:   Head: Normocephalic and atraumatic.    Right Ear: External ear normal.   Left Ear: External ear normal. Nose: Nose normal.   Mouth/Throat: Oropharynx is clear and moist.   Neck: Normal range of motion. Neck supple. No thyromegaly present. Cardiovascular: Normal rate, regular rhythm and normal heart sounds. Pulmonary/Chest: Effort normal and breath sounds normal. She has no wheezes. Abdominal: Soft. Bowel sounds are normal. There is no tenderness. There is no rebound and no guarding. Musculoskeletal: Normal range of motion. Lymphadenopathy:     She has no cervical adenopathy. Neurological: She is alert and oriented to person, place, and time. Skin: Skin is warm and dry. Psychiatric: She has a normal mood and affect. Her behavior is normal.   Nursing note and vitals reviewed. ASSESSMENT and PLAN  Diagnoses and all orders for this visit:    1. Diarrhea, unspecified type -- it is possible that she had side effect of diarrhea with Omeprazole; she would prefer to try Zantac trial before considering another PPI    2. Gastroesophageal reflux disease, esophagitis presence not specified -- will have her try Zantac 150 mg BID x 4 weeks; if symptoms not improving, she agrees to trial of Protonix. -     pantoprazole (PROTONIX) 20 mg tablet; Take 1 Tab by mouth daily. -     raNITIdine (ZANTAC) 150 mg tablet; Take 1 Tab by mouth two (2) times a day.       lab results and schedule of future lab studies reviewed with patient  reviewed diet, exercise and weight control  reviewed medications and side effects in detail

## 2018-06-29 NOTE — MR AVS SNAPSHOT
[address] [facility] 
 
 
 [address] [facility] 
[phone] Patient: Jennifer Masters MRN: [id number] Visit Information Date & Time Provider Department Dept. Phone Encounter #  
 6/29/2018  3:40 PM Rosalita Holter, NP [facility] [id number] Your Appointments 10/4/2018  9:15 AM  
Medicare Physical with Sandra Muñoz MD  
[facility] [address]) Appt Note: Medicare Wellness PE, ncp 5/16/18  
 [address] [address]  
[phone]  
  
   
 [address] [facility] [address] Upcoming Health Maintenance Date Due DTaP/Tdap/Td series (1 - Tdap) 1/12/1966 ZOSTER VACCINE AGE 60> 11/12/2002 GLAUCOMA SCREENING Q2Y 8/19/2018 Influenza Age 5 to Adult 8/1/2018 MEDICARE YEARLY EXAM 10/4/2018 Allergies as of 6/29/2018  Review Complete On: 6/29/2018 By: Rosalita Holter, NP No Known Allergies Current Immunizations  Reviewed on 12/10/2014 Name Date Influenza High Dose Vaccine PF 10/3/2017, 9/18/2016, 9/30/2015 Influenza Vaccine 11/1/2014 Pneumococcal Conjugate (PCV-13) 8/7/2015 Pneumococcal Vaccine (Unspecified Type) 12/10/2012 Not reviewed this visit You Were Diagnosed With   
  
 Codes Comments Diarrhea, unspecified type    -  Primary ICD-10-CM: R19.7 ICD-9-CM: 787.91 Gastroesophageal reflux disease, esophagitis presence not specified     ICD-10-CM: K21.9 ICD-9-CM: 530.81 Vitals BP Pulse Temp Resp Height(growth percentile) Weight(growth percentile) 130/71 (BP 1 Location: Left arm, BP Patient Position: Sitting) 85 98 °F (36.7 °C) (Oral) 14 5' 5\" (1.651 m) 168 lb 9.6 oz (76.5 kg) SpO2 BMI OB Status Smoking Status 97% 28.06 kg/m2 Hysterectomy Never Smoker BMI and BSA Data Body Mass Index Body Surface Area 28.06 kg/m 2 1.87 m 2 Preferred Pharmacy Pharmacy Name Phone [facility] [address]  [facility] [phone] Your Updated Medication List  
  
   
This list is accurate as of 6/29/18  4:22 PM.  Always use your most recent med list.  
  
  
  
  
 ALEVE 220 mg tablet Generic drug:  naproxen sodium Take 220 mg by mouth two (2) times daily (with meals). CITRACAL PLUS D 315-200 mg-unit Tab Generic drug:  calcium citrate-vitamin d3 Take 1 Tab by mouth daily (with breakfast). estradiol 0.5 mg tablet Commonly known as:  ESTRACE Take 1 Tab by mouth daily. melatonin 3 mg tablet Take 1 Tab by mouth nightly. multivitamin tablet Commonly known as:  ONE A DAY Take 1 Tab by mouth BID Mon Wed & Fri.  
  
 omeprazole 20 mg capsule Commonly known as:  PRILOSEC Take 1 Cap by mouth daily. OTHER  
  
 pantoprazole 20 mg tablet Commonly known as:  PROTONIX Take 1 Tab by mouth daily. raNITIdine 150 mg tablet Commonly known as:  ZANTAC Take 1 Tab by mouth two (2) times a day. VITAMIN D3 1,000 unit Cap Generic drug:  cholecalciferol Take 1,000 Units by mouth daily. Prescriptions Printed Refills  
 pantoprazole (PROTONIX) 20 mg tablet 1 Sig: Take 1 Tab by mouth daily. Class: Print Route: Oral  
  
Patient Instructions Gastroesophageal Reflux Disease (GERD): Care Instructions Your Care Instructions Gastroesophageal reflux disease (GERD) is the backward flow of stomach acid into the esophagus. The esophagus is the tube that leads from your throat to your stomach. A one-way valve prevents the stomach acid from moving up into this tube. When you have GERD, this valve does not close tightly enough.  
If you have mild GERD symptoms including heartburn, you may be able to control the problem with antacids or over-the-counter medicine. Changing your diet, losing weight, and making other lifestyle changes can also help reduce symptoms. Follow-up care is a key part of your treatment and safety. Be sure to make and go to all appointments, and call your doctor if you are having problems. It's also a good idea to know your test results and keep a list of the medicines you take. How can you care for yourself at home? · Take your medicines exactly as prescribed. Call your doctor if you think you are having a problem with your medicine. · Your doctor may recommend over-the-counter medicine. For mild or occasional indigestion, antacids, such as Tums, Gaviscon, Mylanta, or Maalox, may help. Your doctor also may recommend over-the-counter acid reducers, such as Pepcid AC, Tagamet HB, Zantac 75, or Prilosec. Read and follow all instructions on the label. If you use these medicines often, talk with your doctor. · Change your eating habits. ¨ It's best to eat several small meals instead of two or three large meals. ¨ After you eat, wait 2 to 3 hours before you lie down. ¨ Chocolate, mint, and alcohol can make GERD worse. ¨ Spicy foods, foods that have a lot of acid (like tomatoes and oranges), and coffee can make GERD symptoms worse in some people. If your symptoms are worse after you eat a certain food, you may want to stop eating that food to see if your symptoms get better. · Do not smoke or chew tobacco. Smoking can make GERD worse. If you need help quitting, talk to your doctor about stop-smoking programs and medicines. These can increase your chances of quitting for good. · If you have GERD symptoms at night, raise the head of your bed 6 to 8 inches by putting the frame on blocks or placing a foam wedge under the head of your mattress. (Adding extra pillows does not work.) · Do not wear tight clothing around your middle. · Lose weight if you need to. Losing just 5 to 10 pounds can help. When should you call for help? Call your doctor now or seek immediate medical care if: 
? · You have new or different belly pain. ? · Your stools are black and tarlike or have streaks of blood. ? Watch closely for changes in your health, and be sure to contact your doctor if: 
? · Your symptoms have not improved after 2 days. ? · Food seems to catch in your throat or chest.  
Where can you learn more? Go to http://ihsan-lilian.info/. Enter U930 in the search box to learn more about \"Gastroesophageal Reflux Disease (GERD): Care Instructions. \" Current as of: May 12, 2017 Content Version: 11.4 © 1282-7461 [employer]. Care instructions adapted under license by [employer] (which disclaims liability or warranty for this information). If you have questions about a medical condition or this instruction, always ask your healthcare professional. [place] [address] any warranty or liability for your use of this information. Introducing [facility] & HEALTH SERVICES! Dear Peter Ferroica: 
Thank you for requesting a [email or web address] account. Our records indicate that you already have an active [email or web address] account. You can access your account anytime at [email or web address] Did you know that you can access your hospital and ER discharge instructions at any time in [email or web address]? You can also review all of your test results from your hospital stay or ER visit. Additional Information If you have questions, please visit the Frequently Asked Questions section of the [email or web address] website at [email or web address] Remember, [email or web address] is NOT to be used for urgent needs. For medical emergencies, dial 911. Now available from your iPhone and Android! Please provide this summary of care documentation to your next provider. Your primary care clinician is listed as Reji Rose. If you have any questions after today's visit, please call [phone].

## 2018-09-07 ENCOUNTER — OFFICE VISIT (OUTPATIENT)
Dept: INTERNAL MEDICINE CLINIC | Age: 73
End: 2018-09-07

## 2018-09-07 VITALS
RESPIRATION RATE: 18 BRPM | HEIGHT: 65 IN | OXYGEN SATURATION: 95 % | DIASTOLIC BLOOD PRESSURE: 84 MMHG | HEART RATE: 78 BPM | TEMPERATURE: 97.9 F | WEIGHT: 169.25 LBS | BODY MASS INDEX: 28.2 KG/M2 | SYSTOLIC BLOOD PRESSURE: 127 MMHG

## 2018-09-07 DIAGNOSIS — K21.9 GASTROESOPHAGEAL REFLUX DISEASE WITHOUT ESOPHAGITIS: ICD-10-CM

## 2018-09-07 DIAGNOSIS — K21.9 GASTROESOPHAGEAL REFLUX DISEASE WITHOUT ESOPHAGITIS: Primary | ICD-10-CM

## 2018-09-07 RX ORDER — CALCIUM CARBONATE 200(500)MG
1 TABLET,CHEWABLE ORAL DAILY
COMMUNITY

## 2018-09-07 RX ORDER — PANTOPRAZOLE SODIUM 40 MG/1
40 TABLET, DELAYED RELEASE ORAL DAILY
Qty: 30 TAB | Refills: 0 | Status: SHIPPED | OUTPATIENT
Start: 2018-09-07 | End: 2018-09-07 | Stop reason: SDUPTHER

## 2018-09-07 NOTE — PATIENT INSTRUCTIONS

## 2018-09-07 NOTE — MR AVS SNAPSHOT
[address] [facility] 
 
 
 [address] [place] 100[address] [facility] 
[phone] Patient: Heather Kumar MRN: [id number] Visit Information Date & Time Provider Department Dept. Phone Encounter #  
 9/7/2018  3:00 PM Matt Vargas MD [facility] [id number] Follow-up Instructions Return in about 3 weeks (around 9/28/2018). Your Appointments 10/4/2018  9:15 AM  
Medicare Physical with Matt Vargas MD  
[facility] [place] [facility] [facility]) Appt Note: Medicare Wellness PE, ncp 5/16/18  
 [address] [facility] [address]  
[phone]  
  
   
 [address] [facility] [address] Upcoming Health Maintenance Date Due DTaP/Tdap/Td series (1 - Tdap) 1/12/1964 ZOSTER VACCINE AGE 60> 11/12/2002 Influenza Age 5 to Adult 8/1/2018 GLAUCOMA SCREENING Q2Y 8/19/2018 MEDICARE YEARLY EXAM 10/4/2018 Allergies as of 9/7/2018  Review Complete On: 9/7/2018 By: Anabel Garrison LPN No Known Allergies Current Immunizations  Reviewed on 12/10/2014 Name Date Influenza High Dose Vaccine PF 10/3/2017, 9/18/2016, 9/30/2015 Influenza Vaccine 11/1/2014 Pneumococcal Conjugate (PCV-13) 8/7/2015 Pneumococcal Vaccine (Unspecified Type) 12/10/2012 Not reviewed this visit You Were Diagnosed With   
  
 Codes Comments Gastroesophageal reflux disease without esophagitis    -  Primary ICD-10-CM: K21.9 ICD-9-CM: 530.81 Vitals BP Pulse Temp Resp Height(growth percentile) Weight(growth percentile) 127/84 (BP 1 Location: Left arm, BP Patient Position: Sitting) 78 97.9 °F (36.6 °C) (Oral) 18 5' 5\" (1.651 m) 169 lb 4 oz (76.8 kg) SpO2 BMI OB Status Smoking Status 95% 28.16 kg/m2 Hysterectomy Never Smoker Vitals History BMI and BSA Data Body Mass Index Body Surface Area 28.16 kg/m 2 1.88 m 2 Preferred Pharmacy Pharmacy Name Phone [facility] [address], [address] [facility] [address] AT [address] [place] [address] [phone] Your Updated Medication List  
  
   
This list is accurate as of 9/7/18  3:40 PM.  Always use your most recent med list.  
  
  
  
  
 ALEVE 220 mg tablet Generic drug:  naproxen sodium Take 220 mg by mouth two (2) times daily (with meals). calcium carbonate 200 mg calcium (500 mg) Chew Commonly known as:  TUMS Take 1 Tab by mouth daily. CITRACAL PLUS D 315-200 mg-unit Tab Generic drug:  calcium citrate-vitamin d3 Take 1 Tab by mouth daily (with breakfast). estradiol 0.5 mg tablet Commonly known as:  ESTRACE Take 1 Tab by mouth daily. melatonin 3 mg tablet Take 1 Tab by mouth nightly. multivitamin tablet Commonly known as:  ONE A DAY Take 1 Tab by mouth BID Mon Wed & Fri.  
  
 OTHER  
  
 pantoprazole 40 mg tablet Commonly known as:  PROTONIX Take 1 Tab by mouth daily. Indications: gastroesophageal reflux disease VITAMIN D3 1,000 unit Cap Generic drug:  cholecalciferol Take 1,000 Units by mouth daily. Prescriptions Sent to Pharmacy Refills  
 pantoprazole (PROTONIX) 40 mg tablet 0 Sig: Take 1 Tab by mouth daily. Indications: gastroesophageal reflux disease Class: Normal  
 Pharmacy: [facility] [address], [address] [facility] [address] AT [address] [place] [address] ([facility] #: [phone] Route: Oral  
  
Follow-up Instructions Return in about 3 weeks (around 9/28/2018). Patient Instructions Gastroesophageal Reflux Disease (GERD): Care Instructions Your Care Instructions Gastroesophageal reflux disease (GERD) is the backward flow of stomach acid into the esophagus.  The esophagus is the tube that leads from your throat to your stomach. A one-way valve prevents the stomach acid from moving up into this tube. When you have GERD, this valve does not close tightly enough. If you have mild GERD symptoms including heartburn, you may be able to control the problem with antacids or over-the-counter medicine. Changing your diet, losing weight, and making other lifestyle changes can also help reduce symptoms. Follow-up care is a key part of your treatment and safety. Be sure to make and go to all appointments, and call your doctor if you are having problems. It's also a good idea to know your test results and keep a list of the medicines you take. How can you care for yourself at home? · Take your medicines exactly as prescribed. Call your doctor if you think you are having a problem with your medicine. · Your doctor may recommend over-the-counter medicine. For mild or occasional indigestion, antacids, such as Tums, Gaviscon, Mylanta, or Maalox, may help. Your doctor also may recommend over-the-counter acid reducers, such as Pepcid AC, Tagamet HB, Zantac 75, or Prilosec. Read and follow all instructions on the label. If you use these medicines often, talk with your doctor. · Change your eating habits. ¨ It's best to eat several small meals instead of two or three large meals. ¨ After you eat, wait 2 to 3 hours before you lie down. ¨ Chocolate, mint, and alcohol can make GERD worse. ¨ Spicy foods, foods that have a lot of acid (like tomatoes and oranges), and coffee can make GERD symptoms worse in some people. If your symptoms are worse after you eat a certain food, you may want to stop eating that food to see if your symptoms get better. · Do not smoke or chew tobacco. Smoking can make GERD worse. If you need help quitting, talk to your doctor about stop-smoking programs and medicines. These can increase your chances of quitting for good.  
· If you have GERD symptoms at night, raise the head of your bed 6 to 8 inches by putting the frame on blocks or placing a foam wedge under the head of your mattress. (Adding extra pillows does not work.) · Do not wear tight clothing around your middle. · Lose weight if you need to. Losing just 5 to 10 pounds can help. When should you call for help? Call your doctor now or seek immediate medical care if: 
  · You have new or different belly pain.  
  · Your stools are black and tarlike or have streaks of blood.  
 Watch closely for changes in your health, and be sure to contact your doctor if: 
  · Your symptoms have not improved after 2 days.  
  · Food seems to catch in your throat or chest.  
Where can you learn more? Go to http://ihsan-lilian.info/. Enter E299 in the search box to learn more about \"Gastroesophageal Reflux Disease (GERD): Care Instructions. \" Current as of: May 12, 2017 Content Version: 11.7 © 8706-2831 [employer]. Care instructions adapted under license by [employer] (which disclaims liability or warranty for this information). If you have questions about a medical condition or this instruction, always ask your healthcare professional. [place] [address] any warranty or liability for your use of this information. Introducing [facility]! Dear Minnie Castellanos: 
Thank you for requesting a eshtery account. Our records indicate that you already have an active eshtery account. You can access your account anytime at [email or web address] Did you know that you can access your hospital and ER discharge instructions at any time in eshtery? You can also review all of your test results from your hospital stay or ER visit. Additional Information If you have questions, please visit the Frequently Asked Questions section of the eshtery website at [email or web address] Remember, eshtery is NOT to be used for urgent needs. For medical emergencies, dial 911. Now available from your iPhone and Android! Please provide this summary of care documentation to your next provider. Your primary care clinician is listed as Hany Romero. If you have any questions after today's visit, please call [phone].

## 2018-09-07 NOTE — PROGRESS NOTES
HISTORY OF PRESENT ILLNESS  Emory Woods is a 74 y.o. female. HPI  Problem visit:  Emory Woods is here for complaint of burning in throat after eating, more belching. Problem began 1 year(s) ago. Severity is moderate  Character of problem: worsening over time, burning as above. Daily. Eating too much makes the problem worse. Vomiting once makes the problem better. Associated symptoms include: no dysphagia. She did lose wt then gained it back. Treatments tried include: tums helps. Tried zantac gave constipated. prilosec ? Helped but caused diarrhea. No bloody/black stools. Some fatigue. She does not snore. No cough      Patient Active Problem List   Diagnosis Code    Basal cell cancer C44.91    RBBB I45.10    Advanced care planning/counseling discussion Z71.89     Past Medical History:   Diagnosis Date    Achilles tendinitis 2015    Arthritis     knees    Falling hair     Fatigue     Insomnia     Joint pain     Plantar fasciitis      No Known Allergies  Current Outpatient Prescriptions on File Prior to Visit   Medication Sig Dispense Refill    estradiol (ESTRACE) 0.5 mg tablet Take 1 Tab by mouth daily. 90 Tab 1    naproxen sodium (ALEVE) 220 mg tablet Take 220 mg by mouth two (2) times daily (with meals).  OTHER       melatonin 3 mg tablet Take 1 Tab by mouth nightly.  calcium citrate-vitamin d3 (CITRACAL + D) 315-200 mg-unit tab Take 1 Tab by mouth daily (with breakfast).  multivitamin (ONE A DAY) tablet Take 1 Tab by mouth BID Mon Wed & Fri.  Cholecalciferol, Vitamin D3, (VITAMIN D3) 1,000 unit cap Take 1,000 Units by mouth daily. No current facility-administered medications on file prior to visit.       Social History   Substance Use Topics    Smoking status: Never Smoker    Smokeless tobacco: Never Used    Alcohol use 1.2 - 1.8 oz/week     2 - 3 Glasses of wine per week      Comment: social         ROS    Physical Exam   Constitutional: She appears well-developed and well-nourished. No distress. /84 (BP 1 Location: Left arm, BP Patient Position: Sitting)  Pulse 78  Temp 97.9 °F (36.6 °C) (Oral)   Resp 18  Ht 5' 5\" (1.651 m)  Wt 169 lb 4 oz (76.8 kg)  SpO2 95%  BMI 28.16 kg/m2Body mass index is 28.16 kg/(m^2). HENT:   Mouth/Throat: Oropharynx is clear and moist.   Oropharynx is crowded. Neck: No JVD present. Carotid bruit is not present. Cardiovascular: Normal rate, regular rhythm, normal heart sounds and intact distal pulses. Pulmonary/Chest: Effort normal and breath sounds normal.   Abdominal: Soft. Normal appearance and bowel sounds are normal. She exhibits no distension and no mass. There is no hepatosplenomegaly. There is no tenderness. Musculoskeletal: She exhibits no edema. Neurological: She is alert. Skin: Skin is warm and dry. She is not diaphoretic. Nursing note and vitals reviewed. ASSESSMENT and PLAN  Diagnoses and all orders for this visit:    1. Gastroesophageal reflux disease without esophagitis - no alarm symptoms. change to PPI. Chencho Rodriguez was counseled on this new medication prescribed. Adverse effects, risks and monitoring of medication along with potential benefits of medication were discussed. All of her questions about the medication were answered. Recheck in 3-4 weeks. Refer to GI if symptoms not improving. The patient was given written instructions detailing her diagnoses and recommendations made today at the visit. Low acid diet. -     pantoprazole (PROTONIX) 40 mg tablet; Take 1 Tab by mouth daily. Indications: gastroesophageal reflux disease      Follow-up Disposition:  Return in about 3 weeks (around 9/28/2018).

## 2018-09-09 RX ORDER — PANTOPRAZOLE SODIUM 40 MG/1
TABLET, DELAYED RELEASE ORAL
Qty: 90 TAB | Refills: 0 | Status: SHIPPED | OUTPATIENT
Start: 2018-09-09 | End: 2018-10-04 | Stop reason: ALTCHOICE

## 2018-10-04 ENCOUNTER — OFFICE VISIT (OUTPATIENT)
Dept: INTERNAL MEDICINE CLINIC | Age: 73
End: 2018-10-04

## 2018-10-04 VITALS
TEMPERATURE: 97.2 F | DIASTOLIC BLOOD PRESSURE: 79 MMHG | WEIGHT: 170.25 LBS | OXYGEN SATURATION: 96 % | SYSTOLIC BLOOD PRESSURE: 116 MMHG | RESPIRATION RATE: 18 BRPM | HEART RATE: 72 BPM | HEIGHT: 65 IN | BODY MASS INDEX: 28.36 KG/M2

## 2018-10-04 DIAGNOSIS — Z78.0 MENOPAUSE: ICD-10-CM

## 2018-10-04 DIAGNOSIS — Z23 ENCOUNTER FOR IMMUNIZATION: ICD-10-CM

## 2018-10-04 DIAGNOSIS — K21.9 GASTROESOPHAGEAL REFLUX DISEASE WITHOUT ESOPHAGITIS: ICD-10-CM

## 2018-10-04 DIAGNOSIS — Z78.0 POSTMENOPAUSAL: ICD-10-CM

## 2018-10-04 DIAGNOSIS — Z00.00 MEDICARE ANNUAL WELLNESS VISIT, SUBSEQUENT: Primary | ICD-10-CM

## 2018-10-04 DIAGNOSIS — Z71.89 ADVANCED CARE PLANNING/COUNSELING DISCUSSION: ICD-10-CM

## 2018-10-04 DIAGNOSIS — Z13.31 SCREENING FOR DEPRESSION: ICD-10-CM

## 2018-10-04 RX ORDER — ESTRADIOL 0.5 MG/1
0.25 TABLET ORAL DAILY
Qty: 1 TAB | Refills: 1
Start: 2018-10-04

## 2018-10-04 NOTE — PATIENT INSTRUCTIONS
Medicare Part B Preventive Services Guidelines/Limitations Date last completed and Frequency Due Date Bone Mass Measurement 
(age 72 & older, biennial) Requires diagnosis related to osteoporosis or estrogen deficiency. Biennial benefit unless patient has history of long-term glucocorticoid tx or baseline is needed because initial test was by other method Completed 9/2015 Recommended every 2 years As recommended by your PCP or Specialist 
  
Cardiovascular Screening Blood Tests (every 5 years) Total cholesterol, HDL, Triglycerides Order as a panel if possible Completed 8/2015 As recommended by your PCP As recommended by your PCP or Specialist  
Colorectal Cancer Screening 
-Fecal occult blood test (annual) -Flexible sigmoidoscopy (5y) 
-Screening colonoscopy (10y) -Barium Enema Age 49-80; After age [de-identified] if history of abnormal results Completed 7/20/2017 Recommended follow-up in 5 years  As recommended by your PCP or Specialist 
  
Counseling to Prevent Tobacco Use (up to 8 sessions per year) - Counseling greater than 3 and up to 10 minutes - Counseling greater than 10 minutes Patients must be asymptomatic of tobacco-related conditions to receive as preventive service N/A N/A Diabetes Screening Tests (at least every 3 years, Medicare covers annually or at 6-month intervals for prediabetic patients) Fasting blood sugar (FBS) or glucose tolerance test (GTT) Patient must be diagnosed with one of the following: 
-Hypertension, Dyslipidemia, obesity, previous impaired FBS or GTT 
Or any two of the following: overweight, FH of diabetes, age ? 72, history of gestational diabetes, birth of baby weighing more than 9 pounds Completed 3/2018 Recommended every 3 years for non-diabetics As recommended by your PCP or Specialist 
  
Glaucoma Screening (no USPSTF recommendation) Diabetes mellitus, family history, , age 48 or over,  American, age 72 or over Completed 3/2018 Recommended annually As recommended by your PCP or Specialist  
Seasonal Influenza Vaccination (annually)  Completed 9/2018 Recommended Annually Complete TDAP Vaccination  As recommended by your PCP or Specialist 
 
Recommended every 10 years As recommended by your PCP or Specialist  
Zoster (Shingles) Vaccination Covered by Medicare Part D through the pharmacy- PCP provides prescription Never received Recommended once over age 48  As recommended by your PCP or Specialist  
Pneumococcal Vaccination (once after 65)  Pneumo 23- 12/2012 Recommended once over the age of 72 Prevnar 13- 8/2015 Recommended once over the age of 72 Complete Complete Screening Mammography (biennial age 54-69) Annually (age 36 or over) Completed 9/2016 As recommended by your PCP or Specialist 
  
Screening Pap Tests and Pelvic Examination (up to age 79 and after 79 if unknown history or abnormal study last 10 years) Every 24 months except high risk As recommended by your PCP or Specialist 
 As recommended by your PCP or Specialist 
  
Ultrasound Screening for Abdominal Aortic Aneurysm (AAA) (once) Patient must be referred through IPPE and not have had a screening for abdominal aortic aneurysm before under Medicare. Limited to patients who meet one of the following criteria: 
- Men who are 73-68 years old and have smoked more than 100 cigarettes in their lifetime. 
-Anyone with a FH of AAA 
-Anyone recommended for screening by USPSTF Not indicated unless recommended by PCP Not indicated unless recommended by PCP Family Practice Management 2011 Please bring a copy of your completed advance medical directive to the office so it may be added to your medical record. Thank you. If you have any questions or concerns please feel free to contact me at [phone]. It was a pleasure meeting you today and participating in your healthcare. Beba Tucker RN Well Visit, Over 72: Care Instructions Your Care Instructions Physical exams can help you stay healthy. Your doctor has checked your overall health and may have suggested ways to take good care of yourself. He or she also may have recommended tests. At home, you can help prevent illness with healthy eating, regular exercise, and other steps. Follow-up care is a key part of your treatment and safety. Be sure to make and go to all appointments, and call your doctor if you are having problems. It's also a good idea to know your test results and keep a list of the medicines you take. How can you care for yourself at home? · Reach and stay at a healthy weight. This will lower your risk for many problems, such as obesity, diabetes, heart disease, and high blood pressure. · Get at least 30 minutes of exercise on most days of the week. Walking is a good choice. You also may want to do other activities, such as running, swimming, cycling, or playing tennis or team sports. · Do not smoke. Smoking can make health problems worse. If you need help quitting, talk to your doctor about stop-smoking programs and medicines. These can increase your chances of quitting for good. · Protect your skin from too much sun. When you're outdoors from 10 a.m. to 4 p.m., stay in the shade or cover up with clothing and a hat with a wide brim. Wear sunglasses that block UV rays. Even when it's cloudy, put broad-spectrum sunscreen (SPF 30 or higher) on any exposed skin. · See a dentist one or two times a year for checkups and to have your teeth cleaned. · Wear a seat belt in the car. · Limit alcohol to 2 drinks a day for men and 1 drink a day for women. Too much alcohol can cause health problems. Follow your doctor's advice about when to have certain tests. These tests can spot problems early. For men and women · Cholesterol.  Your doctor will tell you how often to have this done based on your overall health and other things that can increase your risk for heart attack and stroke. · Blood pressure. Have your blood pressure checked during a routine doctor visit. Your doctor will tell you how often to check your blood pressure based on your age, your blood pressure results, and other factors. · Diabetes. Ask your doctor whether you should have tests for diabetes. · Vision. Experts recommend that you have yearly exams for glaucoma and other age-related eye problems. · Hearing. Tell your doctor if you notice any change in your hearing. You can have tests to find out how well you hear. · Colon cancer tests. Keep having colon cancer tests as your doctor recommends. You can have one of several types of tests. · Heart attack and stroke risk. At least every 4 to 6 years, you should have your risk for heart attack and stroke assessed. Your doctor uses factors such as your age, blood pressure, cholesterol, and whether you smoke or have diabetes to show what your risk for a heart attack or stroke is over the next 10 years. · Osteoporosis. Talk to your doctor about whether you should have a bone density test to find out whether you have thinning bones. Also ask your doctor about whether you should take calcium and vitamin D supplements. For women · Pap test and pelvic exam. You may no longer need a Pap test. Talk with your doctor about whether to stop or continue to have Pap tests. · Breast exam and mammogram. Ask how often you should have a mammogram, which is an X-ray of your breasts. A mammogram can spot breast cancer before it can be felt and when it is easiest to treat. · Thyroid disease. Talk to your doctor about whether to have your thyroid checked as part of a regular physical exam. Women have an increased chance of a thyroid problem. For men · Prostate exam. Talk to your doctor about whether you should have a blood test (called a PSA test) for prostate cancer.  Experts disagree on whether men should have this test. Some experts recommend that you discuss the benefits and risks of the test with your doctor. · Abdominal aortic aneurysm. Ask your doctor whether you should have a test to check for an aneurysm. You may need a test if you ever smoked or if your parent, brother, sister, or child has had an aneurysm. When should you call for help? Watch closely for changes in your health, and be sure to contact your doctor if you have any problems or symptoms that concern you. Where can you learn more? Go to http://ihsan-lilian.info/. Enter D336 in the search box to learn more about \"Well Visit, Over 65: Care Instructions. \" Current as of: March 29, 2018 Content Version: 11.8 © 1202-2536 OpenAgent.com.au. Care instructions adapted under license by [employer] (which disclaims liability or warranty for this information). If you have questions about a medical condition or this instruction, always ask your healthcare professional. [place] [address] any warranty or liability for your use of this information. Medicare Wellness Visit, Female The best way to live healthy is to have a lifestyle where you eat a well-balanced diet, exercise regularly, limit alcohol use, and quit all forms of tobacco/nicotine, if applicable. Regular preventive services are another way to keep healthy. Preventive services (vaccines, screening tests, monitoring & exams) can help personalize your care plan, which helps you manage your own care. Screening tests can find health problems at the earliest stages, when they are easiest to treat. [facility] follows the current, evidence-based guidelines published by the [place] (USPSTF) when recommending preventive services for our patients. Because we follow these guidelines, sometimes recommendations change over time as research supports it. (For example, mammograms used to be recommended annually. Even though Medicare will still pay for an annual mammogram, the newer guidelines recommend a mammogram every two years for women of average risk.) Of course, you and your doctor may decide to screen more often for some diseases, based on your risk and your health status. Preventive services for you include: - Medicare offers their members a free annual wellness visit, which is time for you and your primary care provider to discuss and plan for your preventive service needs. Take advantage of this benefit every year! 
-All adults over the age of 72 should receive the recommended pneumonia vaccines. Current USPSTF guidelines recommend a series of two vaccines for the best pneumonia protection.  
-All adults should have a flu vaccine yearly and a tetanus vaccine every 10 years. All adults age 61 and older should receive a shingles vaccine once in their lifetime.   
-A bone mass density test is recommended when a woman turns 65 to screen for osteoporosis. This test is only recommended one time, as a screening. Some providers will use this same test as a disease monitoring tool if you already have osteoporosis. -All adults age 38-68 who are overweight should have a diabetes screening test once every three years.  
-Other screening tests and preventive services for persons with diabetes include: an eye exam to screen for diabetic retinopathy, a kidney function test, a foot exam, and stricter control over your cholesterol.  
-Cardiovascular screening for adults with routine risk involves an electrocardiogram (ECG) at intervals determined by your doctor.  
-Colorectal cancer screenings should be done for adults age 54-65 with no increased risk factors for colorectal cancer. There are a number of acceptable methods of screening for this type of cancer. Each test has its own benefits and drawbacks. Discuss with your doctor what is most appropriate for you during your annual wellness visit.  The different tests include: colonoscopy (considered the best screening method), a fecal occult blood test, a fecal DNA test, and sigmoidoscopy. -Breast cancer screenings are recommended every other year for women of normal risk, age 54-69. 
-Cervical cancer screenings for women over age 72 are only recommended with certain risk factors.  
-All adults born between [facility] should be screened once for Hepatitis C. Here is a list of your current Health Maintenance items (your personalized list of preventive services) with a due date: 
Health Maintenance Due Topic Date Due  
 DTaP/Tdap/Td  (1 - Tdap) 01/12/1964  Shingles Vaccine (1 of 2) 01/12/1993  Glaucoma Screening   08/19/2018

## 2018-10-04 NOTE — PROGRESS NOTES
Nurse Navigator Medicare Wellness Visit performed by RUBY Mcelroy This is the Subsequent Medicare Annual Wellness Exam, performed 12 months or more after the Initial AWV or the last Subsequent AWV I have reviewed the patient's medical history in detail and updated the computerized patient record. History Past Medical History:  
Diagnosis Date  Achilles tendinitis 2015  Arthritis   
 knees  Falling hair  Fatigue  Insomnia  Joint pain  Plantar fasciitis Past Surgical History:  
Procedure Laterality Date  HX CATARACT REMOVAL    
 bilateral  
 HX HYSTERECTOMY    
 1977 - bleeding  HX ORTHOPAEDIC Left Knee meniscus tear Current Outpatient Prescriptions Medication Sig Dispense Refill  estradiol (ESTRACE) 0.5 mg tablet Take 0.5 Tabs by mouth daily. 1 Tab 1  varicella-zoster recombinant, PF, (SHINGRIX, PF,) 50 mcg/0.5 mL susr injection 0.5 mL by IntraMUSCular route once for 1 dose. 0.5 mL 0  
 naproxen sodium (ALEVE) 220 mg tablet Take 220 mg by mouth two (2) times daily (with meals).  OTHER     
 melatonin 3 mg tablet Take 1 Tab by mouth nightly.  calcium citrate-vitamin d3 (CITRACAL + D) 315-200 mg-unit tab Take 1 Tab by mouth daily (with breakfast).  multivitamin (ONE A DAY) tablet Take 1 Tab by mouth BID Mon Wed & Fri.  Cholecalciferol, Vitamin D3, (VITAMIN D3) 1,000 unit cap Take 1,000 Units by mouth daily.  calcium carbonate (TUMS) 200 mg calcium (500 mg) chew Take 1 Tab by mouth daily. No Known Allergies Family History Problem Relation Age of Onset  Cancer Mother   
  colon  Heart Disease Father  Heart Disease Paternal Uncle  Heart Disease Paternal Grandfather  Diabetes Neg Hx Social History Substance Use Topics  Smoking status: Never Smoker  Smokeless tobacco: Never Used  Alcohol use No  
   Comment: social  
 
Patient Active Problem List  
Diagnosis Code  Basal cell cancer C44.91  
 RBBB I45.10  Advanced care planning/counseling discussion Z71.89 Depression Risk Factor Screening:  
Patient denies feelings of being down, depressed or hopeless at this time. Patient states that they have a strong support system within their family & friends. PHQ over the last two weeks 10/4/2018 PHQ Not Done - Little interest or pleasure in doing things Not at all Feeling down, depressed, irritable, or hopeless Not at all Total Score PHQ 2 0 Alcohol Risk Factor Screening: You do not drink alcohol or very rarely. Functional Ability and Level of Safety:  
Hearing Loss Hearing is good. Patient shares that she recently completed a hearing test with an audiologist at [facility]. Patient adds that her results were within normal limits. Activities of Daily Living The home contains: no safety equipment. Patient does total self care Patient states that she lives with her  in a private residence. Patient states independence in all ADLs & denies the use of assistive devices for ambulation. NN encouraged patient to continue and/ or introduce routine physical exercise into their daily routine as applicable & as recommended by PCP. Patient verbalized understanding & agreement to take this into consideration. ADL Assessment 10/4/2018 Feeding yourself No Help Needed Getting from bed to chair No Help Needed Getting dressed No Help Needed Bathing or showering No Help Needed Walk across the room (includes cane/walker) No Help Needed Using the telphone No Help Needed Taking your medications No Help Needed Preparing meals No Help Needed Managing money (expenses/bills) No Help Needed Moderately strenuous housework (laundry) No Help Needed Shopping for personal items (toiletries/medicines) No Help Needed Shopping for groceries No Help Needed Driving No Help Needed Climbing a flight of stairs No Help Needed Getting to places beyond walking distances No Help Needed Patient reports a trip & fall about a year ago. Patient states that she was in her laundry room when she tripped & fell, hitting her head. Patient states that she was able to get herself up after the fall & she did go to an [facility] ER on [address] for evaluation. Patient adds that she had a CT scan & the results were negative. Patient denies the use of assistive devices for ambulation. Patient denies any additional falls. Patient denies feeling dizzy, weak, lightheaded & states no loss of consciousness at the time of the fall. Patient verbalized fall prevention strategies. Fall Risk Fall Risk Assessment, last 12 mths 10/4/2018 Able to walk? Yes Fall in past 12 months? Yes Fall with injury? No  
Number of falls in past 12 months 1 Fall Risk Score 1 Abuse Screen Patient is not abused Abuse Screening Questionnaire 10/4/2018 Do you ever feel afraid of your partner? Vernona Grayson Are you in a relationship with someone who physically or mentally threatens you? Prasanthnisa Grayson Is it safe for you to go home? Madan Myrick Cognitive Screening Evaluation of Cognitive Function: 
Has your family/caregiver stated any concerns about your memory: no 
 
Patient Care Team  
Patient Care Team: 
Naa Harris MD as PCP - General (Internal Medicine) Assessment/Plan Education and counseling provided: 
Are appropriate based on today's review and evaluation End-of-Life planning (with patient's consent) Pneumococcal Vaccine Influenza Vaccine Screening Mammography Colorectal cancer screening tests Bone mass measurement (DEXA) Screening for glaucoma 
tdap & shingles vaccinations Diagnoses and all orders for this visit: 
 
1. Medicare annual wellness visit, subsequent 2. Postmenopausal 
-     DEXA BONE DENSITY STUDY AXIAL; Future -     [facility] MAMMO BI SCREENING INCL CAD; Future 3. Menopause 
-     estradiol (ESTRACE) 0.5 mg tablet; Take 0.5 Tabs by mouth daily. 4. Encounter for immunization 
-     varicella-zoster recombinant, PF, (SHINGRIX, PF,) 50 mcg/0.5 mL susr injection; 0.5 mL by IntraMUSCular route once for 1 dose. 5. Gastroesophageal reflux disease without esophagitis 6. Advanced care planning/counseling discussion AWV Summary: 1. Patient states conversation about Advanced Medical Directive has been started, but nothing written down yet. NN encouraged patient to complete Advanced Medical Directive paperwork & bring a copy to the office for scanning into the medical record. Patient verbalized understanding & agreement. 2. Patient is up to date on the following immunizations: 
Immunization History Administered Date(s) Administered  Influenza High Dose Vaccine PF 09/30/2015, 09/18/2016, 10/03/2017, 09/14/2018, 09/15/2018  Influenza Vaccine 11/01/2014, 11/01/2015  Pneumococcal Conjugate (PCV-13) 08/07/2015  Pneumococcal Vaccine (Unspecified Type) 12/10/2012, 01/01/2015 Patient confirmed the aforementioned preventative immunization dates are correct. Patient is unable to recall the date of their last tdap vaccine. NN encouraged patient to check home records & if information obtained, to please notify PCP's office with the details. Patient verbalized agreement. Patient denies receiving a shingles vaccine in the past & patient denies ever having a case of shingles in the past. Patient states that she has not gotten a shingles vaccine in the past because the out of pocket cost was too great. Today, PCP provided patient with a Shingrix vaccination prescription & patient verbalized awareness that this vaccine can be obtained at a local pharmacy. PCP informed patient of vaccine details.    
 
3. Patient states that she follows the PCP's recommendations for the following screenings: Mammography (report on file from 9/2016), pap/ pelvic, DEXA scan (report on file from 9/2015) & colonoscopy (report on file from 7/20/2017 performed by Dr. Bonnie Rodriguez at [facility] with recommended follow-up screening in 5 years, 2022). Patient confirmed the aforementioned preventative screening dates. Today, PCP provided patient with orders for a screening dexa scan & a screening mammogram. 
 
4. Patient was not wearing corrective lenses. Patient reports having a routine eye exam & glaucoma screening within the last year performed by Dr. Gertrudis Martinez at the [facility]. NIEVES faxed requesting a copy of patient's last eye exam with glaucoma screening with patient's verbal approval.  
 
Patient verbalized understanding of all information discussed. Patient was given the opportunity to ask questions. Medication reconciliation completed by MA/ LPN and reviewed by PCP. Patient provided AVS, either a printed version or electronic version in [employer], which includes Medicare Wellness Preventative Screening Table. Health Maintenance Due Topic Date Due  
 DTaP/Tdap/Td series (1 - Tdap) 01/12/1964  Shingrix Vaccine Age 50> (1 of 2) 01/12/1993  GLAUCOMA SCREENING Q2Y  08/19/2018

## 2018-10-04 NOTE — PROGRESS NOTES
HISTORY OF PRESENT ILLNESS Jack Good is a 74 y.o. female. HPI Problem follow up: Jack Good returns for follow up visit regarding GERD. she was seen 3 weeks ago in office diagnosed with same and treated with protonix. Workup was significant for same. Notes, labs, studies, imaging related to this problem during prior visit were available . Since that visit, she has significantly improved. she has been compliant with prescribed treatment. Residual symptoms include: no further heartburn since first tab New issues associated with this problem include: none. Patient Active Problem List  
Diagnosis Code  Basal cell cancer C44.91  
 RBBB I45.10  Advanced care planning/counseling discussion Z71.89 Past Medical History:  
Diagnosis Date  Achilles tendinitis 2015  Arthritis   
 knees  Falling hair  Fatigue  Insomnia  Joint pain  Plantar fasciitis No Known Allergies Current Outpatient Prescriptions on File Prior to Visit Medication Sig Dispense Refill  naproxen sodium (ALEVE) 220 mg tablet Take 220 mg by mouth two (2) times daily (with meals).  OTHER     
 melatonin 3 mg tablet Take 1 Tab by mouth nightly.  calcium citrate-vitamin d3 (CITRACAL + D) 315-200 mg-unit tab Take 1 Tab by mouth daily (with breakfast).  multivitamin (ONE A DAY) tablet Take 1 Tab by mouth BID Mon Wed & Fri.  Cholecalciferol, Vitamin D3, (VITAMIN D3) 1,000 unit cap Take 1,000 Units by mouth daily.  calcium carbonate (TUMS) 200 mg calcium (500 mg) chew Take 1 Tab by mouth daily. No current facility-administered medications on file prior to visit. Social History Substance Use Topics  Smoking status: Never Smoker  Smokeless tobacco: Never Used  Alcohol use No  
   Comment: social  
 
 
.   
 
 
ROS Physical Exam 
Visit Vitals  /79 (BP 1 Location: Left arm, BP Patient Position: Sitting)  Pulse 72  Temp 97.2 °F (36.2 °C) (Oral)  Resp 18  Ht 5' 5\" (1.651 m)  Wt 170 lb 4 oz (77.2 kg)  SpO2 96%  BMI 28.33 kg/m2 ASSESSMENT and PLAN Diagnoses and all orders for this visit: 
 
1. Medicare annual wellness visit, subsequent Radha Alcazar received a complete medicare wellness assessment today by Sereg Gonzalez RN. I've reviewed her assessment note and all screening/preventative plans addressed. I also addressed all questions and concerns surrounding the assessment and plans with Radha Alcazar. 2. Postmenopausal 
-     DEXA BONE DENSITY STUDY AXIAL; Future -     [facility] MAMMO BI SCREENING INCL CAD; Future 3. Menopause 
-     estradiol (ESTRACE) 0.5 mg tablet; Take 0.5 Tabs by mouth daily. 4. Encounter for immunization 
-     varicella-zoster recombinant, PF, (SHINGRIX, PF,) 50 mcg/0.5 mL susr injection; 0.5 mL by IntraMUSCular route once for 1 dose. 5. Gastroesophageal reflux disease without esophagitis - resolved. Can stop PPI. Call if symptoms return. We reviewed low acid diet again Follow-up Disposition: 
Return in about 1 year (around 10/4/2019).

## 2018-10-08 ENCOUNTER — TELEPHONE (OUTPATIENT)
Dept: INTERNAL MEDICINE CLINIC | Age: 73
End: 2018-10-08

## 2018-10-08 DIAGNOSIS — Z12.39 SCREENING FOR MALIGNANT NEOPLASM OF BREAST: Primary | ICD-10-CM

## 2018-11-06 ENCOUNTER — HOSPITAL ENCOUNTER (OUTPATIENT)
Dept: MAMMOGRAPHY | Age: 73
Discharge: HOME | End: 2018-11-06
Attending: INTERNAL MEDICINE
Payer: MEDICARE

## 2018-11-06 DIAGNOSIS — Z78.0 POSTMENOPAUSAL: ICD-10-CM

## 2018-11-06 PROCEDURE — 77080 DXA BONE DENSITY AXIAL: CPT

## 2018-12-14 ENCOUNTER — TELEPHONE (OUTPATIENT)
Dept: INTERNAL MEDICINE CLINIC | Age: 73
End: 2018-12-14

## 2018-12-15 NOTE — TELEPHONE ENCOUNTER
Paged by patient. Pt notes that she has been having GI issues for 2 days. Started with diarrhea yesterday. Had several loose BM and today she began having nausea and vomiting. She has not eaten very much. Notes that she now feels better. She will monitor her symptoms and remain hydrated. No severe abd pain or ache. She notes back pain that has been going on for at least 2 months. Discussed staying hydrated, resting. Urgent evaluation if IVF needed and call and make appt with PCP on Monday to see him about chronic back issues.

## 2019-02-04 ENCOUNTER — HOSPITAL ENCOUNTER (OUTPATIENT)
Dept: VASCULAR SURGERY | Age: 74
Discharge: HOME | End: 2019-02-04
Attending: ORTHOPAEDIC SURGERY
Payer: MEDICARE

## 2019-02-04 DIAGNOSIS — M79.89 SWELLING OF LIMB: ICD-10-CM

## 2019-02-04 DIAGNOSIS — M79.662 PAIN OF LEFT CALF: ICD-10-CM

## 2019-02-04 PROCEDURE — 93971 EXTREMITY STUDY: CPT

## 2019-07-26 ENCOUNTER — HOSPITAL ENCOUNTER (OUTPATIENT)
Dept: MAMMOGRAPHY | Age: 74
Discharge: HOME | End: 2019-07-26
Attending: FAMILY MEDICINE
Payer: MEDICARE

## 2019-07-26 DIAGNOSIS — Z12.39 SCREENING BREAST EXAMINATION: ICD-10-CM

## 2019-07-26 PROCEDURE — 77067 SCR MAMMO BI INCL CAD: CPT

## 2023-05-24 RX ORDER — ESTRADIOL 0.5 MG/1
1 TABLET ORAL DAILY
COMMUNITY
Start: 2018-10-04

## 2023-05-24 RX ORDER — LANOLIN ALCOHOL/MO/W.PET/CERES
1 CREAM (GRAM) TOPICAL
COMMUNITY

## 2023-05-24 RX ORDER — UREA 10 %
1 LOTION (ML) TOPICAL DAILY
COMMUNITY

## 2023-05-24 RX ORDER — LANOLIN ALCOHOL/MO/W.PET/CERES
3 CREAM (GRAM) TOPICAL
COMMUNITY
Start: 2017-10-03

## 2023-05-24 RX ORDER — NAPROXEN SODIUM 220 MG
220 TABLET ORAL 2 TIMES DAILY WITH MEALS
COMMUNITY